# Patient Record
Sex: FEMALE | Race: BLACK OR AFRICAN AMERICAN
[De-identification: names, ages, dates, MRNs, and addresses within clinical notes are randomized per-mention and may not be internally consistent; named-entity substitution may affect disease eponyms.]

---

## 2019-08-07 ENCOUNTER — HOSPITAL ENCOUNTER (EMERGENCY)
Dept: HOSPITAL 61 - ER | Age: 84
Discharge: HOME | End: 2019-08-07
Payer: MEDICARE

## 2019-08-07 VITALS — DIASTOLIC BLOOD PRESSURE: 83 MMHG | SYSTOLIC BLOOD PRESSURE: 177 MMHG

## 2019-08-07 VITALS — BODY MASS INDEX: 20.09 KG/M2 | HEIGHT: 66 IN | WEIGHT: 125 LBS

## 2019-08-07 DIAGNOSIS — I10: ICD-10-CM

## 2019-08-07 DIAGNOSIS — F41.9: ICD-10-CM

## 2019-08-07 DIAGNOSIS — Z88.5: ICD-10-CM

## 2019-08-07 DIAGNOSIS — N39.0: Primary | ICD-10-CM

## 2019-08-07 DIAGNOSIS — K21.9: ICD-10-CM

## 2019-08-07 DIAGNOSIS — Z91.041: ICD-10-CM

## 2019-08-07 DIAGNOSIS — E11.9: ICD-10-CM

## 2019-08-07 DIAGNOSIS — R10.84: ICD-10-CM

## 2019-08-07 LAB
ALBUMIN SERPL-MCNC: 3.3 G/DL (ref 3.4–5)
ALBUMIN/GLOB SERPL: 0.9 {RATIO} (ref 1–1.7)
ALP SERPL-CCNC: 53 U/L (ref 46–116)
ALT SERPL-CCNC: 12 U/L (ref 14–59)
ANION GAP SERPL CALC-SCNC: 10 MMOL/L (ref 6–14)
APTT PPP: YELLOW S
AST SERPL-CCNC: 13 U/L (ref 15–37)
BACTERIA #/AREA URNS HPF: (no result) /HPF
BASOPHILS # BLD AUTO: 0 X10^3/UL (ref 0–0.2)
BASOPHILS NFR BLD: 1 % (ref 0–3)
BILIRUB SERPL-MCNC: 0.3 MG/DL (ref 0.2–1)
BILIRUB UR QL STRIP: NEGATIVE
BUN SERPL-MCNC: 9 MG/DL (ref 7–20)
BUN/CREAT SERPL: 13 (ref 6–20)
CALCIUM SERPL-MCNC: 8.9 MG/DL (ref 8.5–10.1)
CHLORIDE SERPL-SCNC: 107 MMOL/L (ref 98–107)
CO2 SERPL-SCNC: 28 MMOL/L (ref 21–32)
CREAT SERPL-MCNC: 0.7 MG/DL (ref 0.6–1)
EOSINOPHIL NFR BLD: 0.3 X10^3/UL (ref 0–0.7)
EOSINOPHIL NFR BLD: 6 % (ref 0–3)
ERYTHROCYTE [DISTWIDTH] IN BLOOD BY AUTOMATED COUNT: 12.8 % (ref 11.5–14.5)
FIBRINOGEN PPP-MCNC: CLEAR MG/DL
GFR SERPLBLD BASED ON 1.73 SQ M-ARVRAT: 96.7 ML/MIN
GLOBULIN SER-MCNC: 3.5 G/DL (ref 2.2–3.8)
GLUCOSE SERPL-MCNC: 166 MG/DL (ref 70–99)
HCT VFR BLD CALC: 43.2 % (ref 36–47)
HGB BLD-MCNC: 14.5 G/DL (ref 12–15.5)
LIPASE: 234 U/L (ref 73–393)
LYMPHOCYTES # BLD: 2.2 X10^3/UL (ref 1–4.8)
LYMPHOCYTES NFR BLD AUTO: 40 % (ref 24–48)
MCH RBC QN AUTO: 30 PG (ref 25–35)
MCHC RBC AUTO-ENTMCNC: 34 G/DL (ref 31–37)
MCV RBC AUTO: 91 FL (ref 79–100)
MONO #: 0.6 X10^3/UL (ref 0–1.1)
MONOCYTES NFR BLD: 11 % (ref 0–9)
NEUT #: 2.4 X10^3/UL (ref 1.8–7.7)
NEUTROPHILS NFR BLD AUTO: 43 % (ref 31–73)
NITRITE UR QL STRIP: NEGATIVE
PH UR STRIP: 6 [PH]
PLATELET # BLD AUTO: 238 X10^3/UL (ref 140–400)
POTASSIUM SERPL-SCNC: 4.2 MMOL/L (ref 3.5–5.1)
PROT SERPL-MCNC: 6.8 G/DL (ref 6.4–8.2)
PROT UR STRIP-MCNC: NEGATIVE MG/DL
RBC # BLD AUTO: 4.77 X10^6/UL (ref 3.5–5.4)
RBC #/AREA URNS HPF: (no result) /HPF (ref 0–2)
SODIUM SERPL-SCNC: 145 MMOL/L (ref 136–145)
SQUAMOUS #/AREA URNS LPF: (no result) /LPF
UROBILINOGEN UR-MCNC: 0.2 MG/DL
WBC # BLD AUTO: 5.5 X10^3/UL (ref 4–11)
WBC #/AREA URNS HPF: (no result) /HPF (ref 0–4)

## 2019-08-07 PROCEDURE — 99285 EMERGENCY DEPT VISIT HI MDM: CPT

## 2019-08-07 PROCEDURE — 85025 COMPLETE CBC W/AUTO DIFF WBC: CPT

## 2019-08-07 PROCEDURE — 36415 COLL VENOUS BLD VENIPUNCTURE: CPT

## 2019-08-07 PROCEDURE — 83690 ASSAY OF LIPASE: CPT

## 2019-08-07 PROCEDURE — 80053 COMPREHEN METABOLIC PANEL: CPT

## 2019-08-07 PROCEDURE — 84484 ASSAY OF TROPONIN QUANT: CPT

## 2019-08-07 PROCEDURE — 81001 URINALYSIS AUTO W/SCOPE: CPT

## 2019-08-07 PROCEDURE — 93005 ELECTROCARDIOGRAM TRACING: CPT

## 2019-08-07 NOTE — PHYS DOC
Past Medical History


Past Medical History:  Anxiety, Dementia, Diabetes-Type II, GERD, Hypertension, 

Seizure


Past Surgical History:  No Surgical History


Alcohol Use:  None


Drug Use:  None





Adult General


Chief Complaint


Chief Complaint:  ABDOMINAL PAIN





HPI


HPI


84-year-old female presenting to the emergency department today with a 

generalized abdominal pain that occurred a couple hours prior to arrival. Her 

son is here with her. Now her pain is resolved and she is feeling much better 

and would like to be discharged home. The pain was a throbbing nonradiating 

moderate pain that did not have any associated factors. Currently the patient is

asymptomatic.





Review of systems is negative for fevers chills vomiting headache chest pain or 

shortness of breath. All other review of systems is negative.





ED course: 84-year-old female presenting to the emergency department today with 

initially generalized abdominal pain that has completely resolved. Blood 

pressure is mildly elevated, otherwise her vital signs are unremarkable. Her 

abdomen is soft and nontender. I asked that we do some blood work and a urine 

testing before she go home which they were willing to. Blood work is 

unremarkable. Urine testing shows a probable urinary tract infection. We will 

give her Macrobid for treatment. She'll follow-up with her doctor in 2 days.





Review of Systems


Review of Systems





Constitutional: Denies fever or chills []


Eyes: Denies change in visual acuity, redness, or eye pain []


HENT: Denies nasal congestion or sore throat []


Respiratory: Denies cough or shortness of breath []


Cardiovascular: No additional information not addressed in HPI []


GI: Denies abdominal pain, nausea, vomiting, bloody stools or diarrhea []


: Denies dysuria or hematuria []


Musculoskeletal: Denies back pain or joint pain []


Integument: Denies rash or skin lesions []


Neurologic: Denies headache, focal weakness or sensory changes []


Endocrine: Denies polyuria or polydipsia []





All other systems were reviewed and found to be within normal limits, except as 

documented in this note.





Allergies


Allergies





Allergies








Coded Allergies Type Severity Reaction Last Updated Verified


 


  codeine Allergy Intermediate  12/3/18 Yes


 


  iodine Allergy Intermediate  12/3/18 Yes











Physical Exam


Physical Exam





Constitutional: Well developed, well nourished, no acute distress, non-toxic 

appearance. []


HENT: Normocephalic, atraumatic, bilateral external ears normal, oropharynx 

moist, no oral exudates, nose normal. []


Eyes: PERRLA, EOMI, conjunctiva normal, no discharge. [] 


Neck: Normal range of motion, no tenderness, supple, no stridor. [] 


Cardiovascular:Heart rate regular rhythm, no murmur []


Lungs & Thorax:  Bilateral breath sounds clear to auscultation []


Abdomen: Bowel sounds normal, soft, no tenderness, no masses, no pulsatile 

masses. [] 


Skin: Warm, dry, no erythema, no rash. [] 


Back: No tenderness, no CVA tenderness. [] 


Extremities: No tenderness, no cyanosis, no clubbing, ROM intact, no edema. [] 


Neurologic: Alert and oriented X 3, normal motor function, normal sensory 

function, no focal deficits noted. []


Psychologic: Affect normal, judgement normal, mood normal. []





Current Patient Data


Vital Signs





                                   Vital Signs








  Date Time  Temp Pulse Resp B/P (MAP) Pulse Ox O2 Delivery O2 Flow Rate FiO2


 


8/7/19 09:36 98.1 70 16 189/92 (124) 96 Room Air  





 98.1       








Lab Values





                                Laboratory Tests








Test


 8/7/19


09:45 8/7/19


10:53


 


Urine Collection Type Unknown   


 


Urine Color Yellow   


 


Urine Clarity Clear   


 


Urine pH 6.0   


 


Urine Specific Gravity 1.010   


 


Urine Protein


 Negative mg/dL


(NEG-TRACE) 





 


Urine Glucose (UA)


 Negative mg/dL


(NEG) 





 


Urine Ketones (Stick)


 Negative mg/dL


(NEG) 





 


Urine Blood


 Negative (NEG)


 





 


Urine Nitrite


 Negative (NEG)


 





 


Urine Bilirubin


 Negative (NEG)


 





 


Urine Urobilinogen Dipstick


 0.2 mg/dL (0.2


mg/dL) 





 


Urine Leukocyte Esterase


 Moderate (NEG)


 





 


Urine RBC


 3-5 /HPF (0-2)


 





 


Urine WBC


 5-10 /HPF


(0-4) 





 


Urine Squamous Epithelial


Cells Few /LPF  


 





 


Urine Bacteria


 Mod /HPF


(0-FEW) 





 


White Blood Count


 


 5.5 x10^3/uL


(4.0-11.0)


 


Red Blood Count


 


 4.77 x10^6/uL


(3.50-5.40)


 


Hemoglobin


 


 14.5 g/dL


(12.0-15.5)


 


Hematocrit


 


 43.2 %


(36.0-47.0)


 


Mean Corpuscular Volume


 


 91 fL ()





 


Mean Corpuscular Hemoglobin  30 pg (25-35)  


 


Mean Corpuscular Hemoglobin


Concent 


 34 g/dL


(31-37)


 


Red Cell Distribution Width


 


 12.8 %


(11.5-14.5)


 


Platelet Count


 


 238 x10^3/uL


(140-400)


 


Neutrophils (%) (Auto)  43 % (31-73)  


 


Lymphocytes (%) (Auto)  40 % (24-48)  


 


Monocytes (%) (Auto)  11 % (0-9)  H


 


Eosinophils (%) (Auto)  6 % (0-3)  H


 


Basophils (%) (Auto)  1 % (0-3)  


 


Neutrophils # (Auto)


 


 2.4 x10^3/uL


(1.8-7.7)


 


Lymphocytes # (Auto)


 


 2.2 x10^3/uL


(1.0-4.8)


 


Monocytes # (Auto)


 


 0.6 x10^3/uL


(0.0-1.1)


 


Eosinophils # (Auto)


 


 0.3 x10^3/uL


(0.0-0.7)


 


Basophils # (Auto)


 


 0.0 x10^3/uL


(0.0-0.2)


 


Sodium Level


 


 145 mmol/L


(136-145)


 


Potassium Level


 


 4.2 mmol/L


(3.5-5.1)


 


Chloride Level


 


 107 mmol/L


()


 


Carbon Dioxide Level


 


 28 mmol/L


(21-32)


 


Anion Gap  10 (6-14)  


 


Blood Urea Nitrogen


 


 9 mg/dL (7-20)





 


Creatinine


 


 0.7 mg/dL


(0.6-1.0)


 


Estimated GFR


(Cockcroft-Gault) 


 96.7  





 


BUN/Creatinine Ratio  13 (6-20)  


 


Glucose Level


 


 166 mg/dL


(70-99)  H


 


Calcium Level


 


 8.9 mg/dL


(8.5-10.1)


 


Total Bilirubin


 


 0.3 mg/dL


(0.2-1.0)


 


Aspartate Amino Transferase


(AST) 


 13 U/L (15-37)


L


 


Alanine Aminotransferase (ALT)


 


 12 U/L (14-59)


L


 


Alkaline Phosphatase


 


 53 U/L


()


 


Troponin I Quantitative


 


 < 0.017 ng/mL


(0.000-0.055)


 


Total Protein


 


 6.8 g/dL


(6.4-8.2)


 


Albumin


 


 3.3 g/dL


(3.4-5.0)  L


 


Albumin/Globulin Ratio


 


 0.9 (1.0-1.7)


L


 


Lipase


 


 234 U/L


()





                                Laboratory Tests


8/7/19 10:53








                                Laboratory Tests


8/7/19 10:53











EKG


EKG


[]





Radiology/Procedures


Radiology/Procedures


[]





Course & Med Decision Making


Course & Med Decision Making


Pertinent Labs and Imaging studies reviewed. (See chart for details)





[]





Dragon Disclaimer


Dragon Disclaimer


This electronic medical record was generated, in whole or in part, using a voice

 recognition dictation system.





Departure


Departure


Impression:  


   Primary Impression:  


   UTI (urinary tract infection)


Disposition:  01 HOME, SELF-CARE


Condition:  STABLE


Referrals:  


ALYX ADHIKARI (PCP)


Patient Instructions:  Abdominal Pain, Urinary Tract Infection





Additional Instructions:  


Thank you for allowing us to participate in your care today.





Return to the emergency department you have any new or worsening symptoms, or if

 you are concerned for any reason. Return to emergency department if you have 

any  new or concerning symptoms including but not limited to fever, chills, 

nausea, vomiting, intractable pain, any new rashes, chest pain, shortness of 

air, uncontrolled bleeding, difficulty breathing, and/or vision loss.





Follow up with your primary care physician within 1-2 days.  Call your Primary 

Doctor tomorrow and inform them of your visit today.  If you do not have a 

primary care provider we are happy to provide you with a list of our primary 

care providers contact information. 





This condition should be evaluated by your primary care physician and any 

recommended consulting services for continued management within 2 days after 

discharge. If at any time, you are having difficulty getting into your primary 

care doctor or a specialist, return to the emergency department.











BIRD CALI MD                Aug 7, 2019 10:15

## 2019-08-07 NOTE — EKG
[facility]

              [address] [place] [address]

Test Date:    2019               Test Time:    10:37:34

Pat Name:     ELISEO BOLTON             Department:   

Patient ID:   [id number]           Room:          

Gender:       F                        Technician:   

:          [id number]               Requested By: BIRD CALI

Order Number: [id number]           Reading MD:     

                                 Measurements

Intervals                              Axis          

Rate:         67                       P:            48

AR:           172                      QRS:          24

QRSD:         82                       T:            50

QT:           416                                    

QTc:          443                                    

                           Interpretive Statements

SINUS RHYTHM

QRS(T) CONTOUR ABNORMALITY

CONSIDER ANTEROSEPTAL MYOCARDIAL DAMAGE

POSSIBLY ABNORMAL ECG

RI6.01

No previous ECG available for comparison

## 2019-12-04 ENCOUNTER — HOSPITAL ENCOUNTER (EMERGENCY)
Dept: HOSPITAL 61 - ER | Age: 84
Discharge: HOME | End: 2019-12-04
Payer: MEDICARE

## 2019-12-04 VITALS — BODY MASS INDEX: 21.99 KG/M2 | WEIGHT: 132 LBS | HEIGHT: 65 IN

## 2019-12-04 VITALS — DIASTOLIC BLOOD PRESSURE: 77 MMHG | SYSTOLIC BLOOD PRESSURE: 133 MMHG
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
## 2019-12-04 DIAGNOSIS — I10: ICD-10-CM
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
## 2019-12-04 DIAGNOSIS — K21.9: ICD-10-CM

## 2019-12-04 DIAGNOSIS — S09.90XA: ICD-10-CM

## 2019-12-04 DIAGNOSIS — Z88.5: ICD-10-CM
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
## 2019-12-04 DIAGNOSIS — F41.9: ICD-10-CM

## 2019-12-04 DIAGNOSIS — Z88.8: ICD-10-CM

## 2019-12-04 DIAGNOSIS — E11.9: ICD-10-CM

## 2019-12-04 DIAGNOSIS — R51: ICD-10-CM

## 2019-12-04 DIAGNOSIS — F03.90: ICD-10-CM

## 2019-12-04 DIAGNOSIS — S00.11XA: Primary | ICD-10-CM

## 2019-12-04 PROCEDURE — 72125 CT NECK SPINE W/O DYE: CPT

## 2019-12-04 PROCEDURE — 70450 CT HEAD/BRAIN W/O DYE: CPT

## 2019-12-04 PROCEDURE — 70480 CT ORBIT/EAR/FOSSA W/O DYE: CPT

## 2019-12-04 PROCEDURE — 99284 EMERGENCY DEPT VISIT MOD MDM: CPT

## 2019-12-04 NOTE — PHYS DOC
Past Medical History


Past Medical History:  Anxiety, Dementia, Diabetes-Type II, GERD, Hypertension, 

Seizure


Past Surgical History:  Other


Additional Past Surgical Histo:  CATARACT, LEFT TOE


Alcohol Use:  None


Drug Use:  None





Adult General


Chief Complaint


Chief Complaint:  MECHANICAL FALL





HPI


HPI





Patient is a 84  year old AA female, accompanied by her family, who presents to 

the emergency department with complaints of bruising to her right eye. Family 

states the patient was sitting on the edge of her bed last night and eating ice 

cream when she slipped off the bed. Patient denies hitting her head however has 

bruising around her right eye. Patient denies any nausea, vomiting. Family 

states that the fall was witnessed, they deny any loss of consciousness or 

bleeding from the nose or ears. She has a history of dementia and is alert and 

oriented per normal according to family. Patient currently denies any pain. She 

denies any vision changes, neck pain, headache, or extremity pain/swelling as 

well. All other ROS is neg unless otherwise noted in HPI.





Review of Systems


Review of Systems


See Above





Allergies


Allergies





Allergies








Coded Allergies Type Severity Reaction Last Updated Verified


 


  codeine Allergy Intermediate  12/3/18 Yes


 


  iodine Allergy Intermediate  12/3/18 Yes











Physical Exam


Physical Exam


See Above


Constitutional: Well developed, well nourished, no acute distress, non-toxic 

appearance. []


HENT: Normocephalic, atraumatic, bilateral external ears normal, bilateral TMs 

normal, oropharynx moist, no oral exudates, nose normal. []


Eyes: PERRLA, EOMI, conjunctiva normal, no discharge; bruising and tenderness to

 lateral right orbit, no crepitus or palpable deformity. [] 


Neck: Normal range of motion, no tenderness, supple, no stridor. [] 


Cardiovascular:Heart rate regular rhythm, no murmur []


Lungs & Thorax:  Bilateral breath sounds clear to auscultation []


Skin: Warm, dry, no erythema, no rash. [] 


Back: No tenderness


Extremities: No cyanosis, no obvious deformity, ROM intact, no edema. [] 


Neurologic: Alert and oriented X 2, no focal deficits noted. []


Psychologic: Affect normal, judgement normal, mood normal. []





Current Patient Data


Vital Signs





                                   Vital Signs








  Date Time  Temp Pulse Resp B/P (MAP) Pulse Ox O2 Delivery O2 Flow Rate FiO2


 


12/4/19 09:20 98.6 77 16 133/77 (95) 96 Room Air  





 98.6       











EKG


EKG


[]





Radiology/Procedures


Radiology/Procedures


PROCEDURE: CT HEAD AND CERVICAL SPINE WO





CT head and cervical spine without contrast 12/4/2019 9:48 AM


 


Indication:   Trauma


 


Comparison: CT head cervical spine December 3, 2018


 


Procedure: Multidetector CT imaging of the head and cervical spine was 


performed without the administration of contrast.


 


Findings: There is no evidence of acute intracranial hemorrhage. There is 


no evidence of acute territorial infarction. Please note that CT is 


limited for evaluation of acute ischemia. No mass effect or midline shift 


is identified . The ventricles and basilar cisterns have an appropriate 


appearance. No abnormal extra-axial fluid collections are seen. No acute 


osseous changes are identified. 


 


Findings: There is no evidence of acute fracture or alignment abnormality 


of the cervical spine. Vertebral body heights and disc spaces are similar 


to comparison study. Degenerative changes are similar. The atlantoaxial 


articulation remains intact. There is no prevertebral soft tissue 


swelling. Soft tissues are otherwise unremarkable. No bony compromise of 


the spinal canal is seen.


 


Impression:


1. No evidence of acute intracranial abnormality 


2. Stable degenerative changes of the cervical spine without evidence of 


acute fracture or alignment abnormality  





PROCEDURE: CT ORBITS WO CONTRAST





CT of the bony orbits without contrast 12/4/2019


 


INDICATION: Fall, right-sided trauma to the superior orbital region.


 


COMPARISON STUDY: None


 


FINDINGS: Multidetector CT imaging of the orbits was obtained without 


contrast for evaluation of bony structures.


 


FINDINGS: Sonographic arches are intact. Paranasal sinuses demonstrate no 


acute hemorrhage. Mild areas of mucosal thickening noted. Note that the 


maxillary sinuses are partially visualized. The maxilla is partially 


visualized. The pterygoid plates appear to be intact. Nasal bones are 


intact


 


 


The bony orbits are grossly intact. Prior cataract surgery noted 


bilaterally. Bilateral globes are otherwise intact. No intraconal or 


extraconal orbital abnormalities are identified. Probable mild 


ecchymosis/subcutaneous hemorrhage is seen overlying the right lateral 


supraorbital region.


 


IMPRESSION: No evidence of acute osseous and normalities involving bony 


orbits.


 


 []





Course & Med Decision Making


Course & Med Decision Making


Pertinent Labs and Imaging studies reviewed. (See chart for details)





[]





Dragon Disclaimer


Dragon Disclaimer


This electronic medical record was generated, in whole or in part, using a voice

 recognition dictation system.





Departure


Departure


Impression:  


   Primary Impression:  


   Fall


   Additional Impression:  


   Closed head injury without loss of consciousness


Disposition:  01 HOME, SELF-CARE


Condition:  STABLE


Referrals:  


YAW WALLS MD (PCP)


Patient Instructions:  Head Injury, Adult, Easy-to-Read





Additional Instructions:  


Tylenol or ibuprofen as needed for pain. Follow the head injury precautions 

provided. Follow up with your primary care doctor in 1-2 days. Return to the ER 

if symptoms worsen.





Problem Qualifiers








   Primary Impression:  


   Fall


   Encounter type:  initial encounter  Qualified Codes:  W19.XXXA - Unspecified 

   fall, initial encounter


   Additional Impression:  


   Closed head injury without loss of consciousness


   Encounter type:  initial encounter  Qualified Codes:  S09.90XA - Unspecified 

   injury of head, initial encounter








DONOVAN LUNA APRN        Dec 4, 2019 10:49

## 2019-12-04 NOTE — RAD
CT of the bony orbits without contrast 12/4/2019

 

INDICATION: Fall, right-sided trauma to the superior orbital region.

 

COMPARISON STUDY: None

 

FINDINGS: Multidetector CT imaging of the orbits was obtained without 

contrast for evaluation of bony structures.

 

FINDINGS: Sonographic arches are intact. Paranasal sinuses demonstrate no 

acute hemorrhage. Mild areas of mucosal thickening noted. Note that the 

maxillary sinuses are partially visualized. The maxilla is partially 

visualized. The pterygoid plates appear to be intact. Nasal bones are 

intact

 

 

The bony orbits are grossly intact. Prior cataract surgery noted 

bilaterally. Bilateral globes are otherwise intact. No intraconal or 

extraconal orbital abnormalities are identified. Probable mild 

ecchymosis/subcutaneous hemorrhage is seen overlying the right lateral 

supraorbital region.

 

IMPRESSION: No evidence of acute osseous and normalities involving bony 

orbits.

 

CT DOSING PQRS STATEMENT: 

One or more of the following individualized dose reduction techniques were

utilized for this examination: 

 1. Automated exposure control 

 2. Adjustment of the mA and/or kV according to patient size  

3. Use of iterative reconstruction technique

 

Electronically signed by: Jakub Lee MD (12/4/2019 10:56 AM) [id number]

## 2019-12-04 NOTE — RAD
CT head and cervical spine without contrast 12/4/2019 9:48 AM

 

Indication:   Trauma

 

Comparison: CT head cervical spine December 3, 2018

 

Procedure: Multidetector CT imaging of the head and cervical spine was 

performed without the administration of contrast.

 

Findings: There is no evidence of acute intracranial hemorrhage. There is 

no evidence of acute territorial infarction. Please note that CT is 

limited for evaluation of acute ischemia. No mass effect or midline shift 

is identified . The ventricles and basilar cisterns have an appropriate 

appearance. No abnormal extra-axial fluid collections are seen. No acute 

osseous changes are identified. 

 

Findings: There is no evidence of acute fracture or alignment abnormality 

of the cervical spine. Vertebral body heights and disc spaces are similar 

to comparison study. Degenerative changes are similar. The atlantoaxial 

articulation remains intact. There is no prevertebral soft tissue 

swelling. Soft tissues are otherwise unremarkable. No bony compromise of 

the spinal canal is seen.

 

Impression:

1. No evidence of acute intracranial abnormality 

2. Stable degenerative changes of the cervical spine without evidence of 

acute fracture or alignment abnormality  

 

CT DOSING PQRS STATEMENT: 

One or more of the following individualized dose reduction techniques were

utilized for this examination: 

 1. Automated exposure control 

 2. Adjustment of the mA and/or kV according to patient size  

3. Use of iterative reconstruction technique

 

Electronically signed by: Jakub Lee MD (12/4/2019 10:42 AM) [id number]

## 2019-12-26 ENCOUNTER — HOSPITAL ENCOUNTER (INPATIENT)
Dept: HOSPITAL 61 - ER | Age: 84
LOS: 1 days | Discharge: HOME | DRG: 101 | End: 2019-12-27
Attending: INTERNAL MEDICINE | Admitting: INTERNAL MEDICINE
Payer: MEDICARE

## 2019-12-26 VITALS — WEIGHT: 124.06 LBS | HEIGHT: 65 IN | BODY MASS INDEX: 20.67 KG/M2

## 2019-12-26 VITALS — DIASTOLIC BLOOD PRESSURE: 72 MMHG | SYSTOLIC BLOOD PRESSURE: 141 MMHG

## 2019-12-26 DIAGNOSIS — I10: ICD-10-CM

## 2019-12-26 DIAGNOSIS — G30.9: ICD-10-CM

## 2019-12-26 DIAGNOSIS — F41.9: ICD-10-CM

## 2019-12-26 DIAGNOSIS — E87.2: ICD-10-CM

## 2019-12-26 DIAGNOSIS — G40.201: Primary | ICD-10-CM

## 2019-12-26 DIAGNOSIS — E78.5: ICD-10-CM

## 2019-12-26 DIAGNOSIS — E11.9: ICD-10-CM

## 2019-12-26 DIAGNOSIS — F02.80: ICD-10-CM

## 2019-12-26 DIAGNOSIS — Z72.820: ICD-10-CM

## 2019-12-26 DIAGNOSIS — G25.0: ICD-10-CM

## 2019-12-26 DIAGNOSIS — Z91.041: ICD-10-CM

## 2019-12-26 DIAGNOSIS — K21.9: ICD-10-CM

## 2019-12-26 DIAGNOSIS — K57.90: ICD-10-CM

## 2019-12-26 DIAGNOSIS — Z82.0: ICD-10-CM

## 2019-12-26 DIAGNOSIS — F32.9: ICD-10-CM

## 2019-12-26 DIAGNOSIS — Z79.899: ICD-10-CM

## 2019-12-26 DIAGNOSIS — Z88.5: ICD-10-CM

## 2019-12-26 DIAGNOSIS — Z79.84: ICD-10-CM

## 2019-12-26 LAB
ALBUMIN SERPL-MCNC: 3.4 G/DL (ref 3.4–5)
ALBUMIN/GLOB SERPL: 1 {RATIO} (ref 1–1.7)
ALP SERPL-CCNC: 63 U/L (ref 46–116)
ALT SERPL-CCNC: 12 U/L (ref 14–59)
ANION GAP SERPL CALC-SCNC: 12 MMOL/L (ref 6–14)
APTT PPP: YELLOW S
AST SERPL-CCNC: 13 U/L (ref 15–37)
BACTERIA #/AREA URNS HPF: 0 /HPF
BASOPHILS # BLD AUTO: 0 X10^3/UL (ref 0–0.2)
BASOPHILS NFR BLD: 1 % (ref 0–3)
BILIRUB SERPL-MCNC: 0.2 MG/DL (ref 0.2–1)
BILIRUB UR QL STRIP: (no result)
BUN SERPL-MCNC: 11 MG/DL (ref 7–20)
BUN/CREAT SERPL: 14 (ref 6–20)
CALCIUM SERPL-MCNC: 8.7 MG/DL (ref 8.5–10.1)
CHLORIDE SERPL-SCNC: 105 MMOL/L (ref 98–107)
CK SERPL-CCNC: 39 U/L (ref 26–192)
CO2 SERPL-SCNC: 28 MMOL/L (ref 21–32)
CREAT SERPL-MCNC: 0.8 MG/DL (ref 0.6–1)
EOSINOPHIL NFR BLD: 0.1 X10^3/UL (ref 0–0.7)
EOSINOPHIL NFR BLD: 2 % (ref 0–3)
ERYTHROCYTE [DISTWIDTH] IN BLOOD BY AUTOMATED COUNT: 13.9 % (ref 11.5–14.5)
FIBRINOGEN PPP-MCNC: CLEAR MG/DL
GFR SERPLBLD BASED ON 1.73 SQ M-ARVRAT: 82.9 ML/MIN
GLOBULIN SER-MCNC: 3.3 G/DL (ref 2.2–3.8)
GLUCOSE SERPL-MCNC: 135 MG/DL (ref 70–99)
HCT VFR BLD CALC: 46.8 % (ref 36–47)
HGB BLD-MCNC: 15.7 G/DL (ref 12–15.5)
HYALINE CASTS #/AREA URNS LPF: (no result) /HPF
LYMPHOCYTES # BLD: 2.9 X10^3/UL (ref 1–4.8)
LYMPHOCYTES NFR BLD AUTO: 44 % (ref 24–48)
MAGNESIUM SERPL-MCNC: 1.7 MG/DL (ref 1.8–2.4)
MCH RBC QN AUTO: 31 PG (ref 25–35)
MCHC RBC AUTO-ENTMCNC: 34 G/DL (ref 31–37)
MCV RBC AUTO: 91 FL (ref 79–100)
MONO #: 0.6 X10^3/UL (ref 0–1.1)
MONOCYTES NFR BLD: 8 % (ref 0–9)
NEUT #: 2.9 X10^3/UL (ref 1.8–7.7)
NEUTROPHILS NFR BLD AUTO: 44 % (ref 31–73)
NITRITE UR QL STRIP: NEGATIVE
PH UR STRIP: 5 [PH]
PLATELET # BLD AUTO: 232 X10^3/UL (ref 140–400)
POTASSIUM SERPL-SCNC: 4.1 MMOL/L (ref 3.5–5.1)
PROT SERPL-MCNC: 6.7 G/DL (ref 6.4–8.2)
PROT UR STRIP-MCNC: 100 MG/DL
RBC # BLD AUTO: 5.14 X10^6/UL (ref 3.5–5.4)
RBC #/AREA URNS HPF: 0 /HPF (ref 0–2)
SODIUM SERPL-SCNC: 145 MMOL/L (ref 136–145)
SQUAMOUS #/AREA URNS LPF: (no result) /LPF
UROBILINOGEN UR-MCNC: 1 MG/DL
WBC # BLD AUTO: 6.6 X10^3/UL (ref 4–11)
WBC #/AREA URNS HPF: 0 /HPF (ref 0–4)

## 2019-12-26 PROCEDURE — 82550 ASSAY OF CK (CPK): CPT

## 2019-12-26 PROCEDURE — 36415 COLL VENOUS BLD VENIPUNCTURE: CPT

## 2019-12-26 PROCEDURE — 83036 HEMOGLOBIN GLYCOSYLATED A1C: CPT

## 2019-12-26 PROCEDURE — P9612 CATHETERIZE FOR URINE SPEC: HCPCS

## 2019-12-26 PROCEDURE — 82607 VITAMIN B-12: CPT

## 2019-12-26 PROCEDURE — 83735 ASSAY OF MAGNESIUM: CPT

## 2019-12-26 PROCEDURE — 83605 ASSAY OF LACTIC ACID: CPT

## 2019-12-26 PROCEDURE — 81001 URINALYSIS AUTO W/SCOPE: CPT

## 2019-12-26 PROCEDURE — 84443 ASSAY THYROID STIM HORMONE: CPT

## 2019-12-26 PROCEDURE — 83880 ASSAY OF NATRIURETIC PEPTIDE: CPT

## 2019-12-26 PROCEDURE — 84484 ASSAY OF TROPONIN QUANT: CPT

## 2019-12-26 PROCEDURE — 82962 GLUCOSE BLOOD TEST: CPT

## 2019-12-26 PROCEDURE — 80177 DRUG SCRN QUAN LEVETIRACETAM: CPT

## 2019-12-26 PROCEDURE — 93005 ELECTROCARDIOGRAM TRACING: CPT

## 2019-12-26 PROCEDURE — G0378 HOSPITAL OBSERVATION PER HR: HCPCS

## 2019-12-26 PROCEDURE — 85025 COMPLETE CBC W/AUTO DIFF WBC: CPT

## 2019-12-26 PROCEDURE — 80048 BASIC METABOLIC PNL TOTAL CA: CPT

## 2019-12-26 PROCEDURE — 71045 X-RAY EXAM CHEST 1 VIEW: CPT

## 2019-12-26 PROCEDURE — 80053 COMPREHEN METABOLIC PANEL: CPT

## 2019-12-26 PROCEDURE — 70450 CT HEAD/BRAIN W/O DYE: CPT

## 2019-12-26 RX ADMIN — HEPARIN SODIUM SCH UNIT: 5000 INJECTION, SOLUTION INTRAVENOUS; SUBCUTANEOUS at 21:30

## 2019-12-26 RX ADMIN — METOPROLOL TARTRATE SCH MG: 50 TABLET, FILM COATED ORAL at 21:18

## 2019-12-26 NOTE — RAD
Examination: CT HEAD WO CONTRAST

 

History: Seizure

 

Comparison/Correlation: 12/4/2019 CT head and cervical spine

 

Findings: Axial images of the head were obtained without contrast. Atrophy

is present. Chronic ischemic changes of the white matter noted. No 

intracranial hemorrhage, midline shift, or mass effect. Bony structures 

are unremarkable. Partial opacification of posterior left ethmoid air 

cells noted. Right uzma bullosa is present.

 

Impression:

No suspicious process.

 

 

PQRS Compliance Statement:

 

One or more of the following individualized dose reduction techniques were

utilized for this examination:  

1. Automated exposure control  

2. Adjustment of the mA and/or kV according to patient size  

3. Use of iterative reconstruction technique

 

Electronically signed by: Richardson Burris MD (12/26/2019 4:39 PM) [facility]

## 2019-12-26 NOTE — PDOC1
History and Physical


Date of Admission


Date of Admission


DATE: 12/26/19 


TIME: 16:24





Identification/Chief Complaint


Chief Complaint


Seizures





Source


Source:  Patient





History of Present Illness


History of Present Illness


Ms Stewart is an 83yo F w/ PMHx Alzheimer's disease, epilepsy (partial complex 

seizures), HTN, HLD, anxiety, depression, DM2 brought to ED by her  due 

to 3 seizures during the day. He notes one of the seizures lasted almost 6 

minutes. She has been taking her keppra 2000mg PO BID, but he notes she ran out 

of lorazepam which he gives her prn TID.





She was apparently confused and agitated in transit, refusing hospital 

evaluation and denying seizure activity, but calmed upon evaluation by the ED 

physician, Dr. Taylor





EKG was NSR, rate of 74, indeterminate axis, incomplete right bundle-branch 

block, no acute distress and T-wave elevation.





CXR clear, CT head negative.





Patient is pleasant, but not oriented on my examination, and she is agreeable to

hospitalization given 3 breakthrough seizures with no obvious inciting event.





Past Medical History


Cardiovascular:  HTN, Hyperlipidemia


CENTRAL NERVOUS SYSTEM:  Dementia, Seizure, Other


GI:  Diverticulosis


Psych:  Anxiety, Depression


Endocrine:  Diabetes





Past Surgical History


Past Surgical History:  Other





Family History


Family History:  Family History Unknown





Social History


Smoke:  No


ALCOHOL:  none


Drugs:  None





Current Medications


Current Medications





Active Scripts


Active


Macrobid 100 Mg Capsule (Nitrofurantoin Monohyd/M-Cryst) 100 Mg Capsule 1 Cap PO

BID


Lorazepam 0.5 Mg Tablet 1 Mg PO PRN TID PRN 30 Days


Reported


Keppra (Levetiracetam) 100 Mg/1 Ml Solution 2,000 Mg PO BID


Ondansetron Odt (Ondansetron) 4 Mg Tab.rapdis 1 Tab PO PRN Q6-8HRS


Lisinopril 40 Mg Tablet 1 Tab PO DAILY


Metformin Hcl 1,000 Mg Tablet 1 Tab PO BID


Escitalopram Oxalate 10 Mg Tablet 10 Mg PO DAILY


Mirtazapine 15 Mg Tablet 1 Tab PO QHS


Donepezil Hcl 10 Mg Tab.rapdis 10 Mg PO HS


Olanzapine 5 Mg Tablet 5 Mg PO DAILY


Metoprolol Succinate ( Xl ) (Metoprolol Succinate) 25 Mg Tab.er.24h 50 Mg PO BID





Allergies


Allergies:  


Coded Allergies:  


     codeine (Verified  Allergy, Intermediate, 12/3/18)


     iodine (Verified  Allergy, Intermediate, 12/3/18)





ROS


General:  YES: Fatigue, Malaise; 


   No: Chills, Night Sweats, Appetite, Other


PSYCHOLOGICAL ROS:  YES: Disorientation, Memory difficulties; 


   No: Anxiety, Behavioral Disorder, Concentration difficultie, Decreased 

libido, Depression, Hallucinations, Hostility, Irritablity, Mood Swings, 

Obsessive thoughts, Physical abuse, Sexual abuse, Sleep disturbances, Suicidal 

ideation, Other


Eyes:  No Blurry vision, No Decreased vision, No Double vision, No Dry eyes, No 

Excessive tearing, No Eye Pain, No Itchy Eyes, No Loss of vision, No 

Photophobia, No Scotomata, No Uses contacts, No Uses glasses, No Other


HEENT:  No: Heacaches, Visual Changes, Hearing change, Nasal congestion, Nasal 

discharge, Oral lesions, Sinus pain, Sore Throat, Epistaxis, Sneezing, Snoring, 

Tinnitus, Vertigo, Vocal changes, Other


ALLERGY AND IMMUNOLOGY:  No: Hives, Insect Bite Sensitivity, Itchy/Watery Eyes, 

Nasal Congestion, Post Nasal Drip, Seasonal Allergies, Other


Hematological and Lymphatic:  No: Bleeding Problems, Blood Clots, Blood 

Transfusions, Brusing, Night Sweats, Pallor, Swollen Lymph Nodes, Other


ENDOCRINE:  No: Breast Changes, Galactorrhea, Hair Pattern Changes, Hot Flashes,

Malaise/lethargy, Mood Swings, Palpitations, Polydipsia/polyuria, Skin Changes, 

Temperature Intolerance, Unexpected Weight Changes, Other


Breast:  No New/Changing Breast Lumps, No Nipple changes, No Nipple discharge, 

No Other


Respiratory:  No: Cough, Hemoptysis, Orthopnea, Pleuritic Pain, Shortness of 

breath, SOB with excertion, Sputum Changes, Stridor, Tachypnea, Wheezing, Other


Cardiovascular:  No Chest Pain, No Palpitations, No Orthopnea, No Paroxysmal 

Noc. Dyspnea, No Edema, No Lt Headedness, No Other


Gastrointestinal:  No Nausea, No Vomiting, No Abdominal Pain, No Diarrhea, No 

Constipation, No Melena, No Hematochezia, No Other


Genitourinary:  No Dysuria, No Frequency, No Incontinence, No Hematuria, No 

Retention, No Discharge, No Urgency, No Pain, No Flank Pain, No Other, No , No ,

No , No , No , No , No 


Musculoskeletal:  No Gait Disturbance, No Joint Pain, No Joint Stiffness, No 

Joint Swelling, No Muscle Pain, No Muscular Weakness, No Pain In:, No Swelling 

In:, No Other


Neurological:  Yes Behavorial Changes, Yes Confusion, Yes Memory Loss, Yes 

Seizures; 


   No Bowel/Bladder ControlChng, No Dizziness, No Gait Disturbance, No 

Headaches, No Impaired Coord/balance, No Numbness/Tingling, No Speech Problems, 

No Tremors, No Visual Changes, No Weakness, No Other


Skin:  No Dry Skin, No Eczema, No Hair Changes, No Lumps, No Mole Changes, No Mo

ttling, No Nail Changes, No Pruritus, No Rash, No Skin Lesion Changes, No Other,

No Acne





Physical Exam


General:  Alert, Cooperative, No acute distress


HEENT:  Atraumatic, PERRLA, EOMI, Mucous membr. moist/pink


Lungs:  Clear to auscultation, Normal air movement


Heart:  S1S2, RRR, no thrills, no rubs, no gallops, no murmurs


Abdomen:  Normal bowel sounds, Soft, No tenderness, No hepatosplenomegaly, No 

masses


Rectal Exam:  not examined


Extremities:  No clubbing, No cyanosis, No edema, Normal pulses, No 

tenderness/swelling


Skin:  No rashes, No breakdown, No significant lesion


Neuro:  Normal gait, Normal speech, Strength at 5/5 X4 ext, Normal tone, 

Sensation intact, Cranial nerves 3-12 NL, Reflexes 2+


Psych/Mental Status:  Other (Confused)





Labs


Labs





Laboratory Tests








Test


 12/26/19


16:10


 


White Blood Count


 6.6 x10^3/uL


(4.0-11.0)


 


Red Blood Count


 5.14 x10^6/uL


(3.50-5.40)


 


Hemoglobin


 15.7 g/dL


(12.0-15.5)


 


Hematocrit


 46.8 %


(36.0-47.0)


 


Mean Corpuscular Volume 91 fL () 


 


Mean Corpuscular Hemoglobin 31 pg (25-35) 


 


Mean Corpuscular Hemoglobin


Concent 34 g/dL


(31-37)


 


Red Cell Distribution Width


 13.9 %


(11.5-14.5)


 


Platelet Count


 232 x10^3/uL


(140-400)


 


Neutrophils (%) (Auto) 44 % (31-73) 


 


Lymphocytes (%) (Auto) 44 % (24-48) 


 


Monocytes (%) (Auto) 8 % (0-9) 


 


Eosinophils (%) (Auto) 2 % (0-3) 


 


Basophils (%) (Auto) 1 % (0-3) 


 


Neutrophils # (Auto)


 2.9 x10^3/uL


(1.8-7.7)


 


Lymphocytes # (Auto)


 2.9 x10^3/uL


(1.0-4.8)


 


Monocytes # (Auto)


 0.6 x10^3/uL


(0.0-1.1)


 


Eosinophils # (Auto)


 0.1 x10^3/uL


(0.0-0.7)


 


Basophils # (Auto)


 0.0 x10^3/uL


(0.0-0.2)








Laboratory Tests








Test


 12/26/19


16:10


 


White Blood Count


 6.6 x10^3/uL


(4.0-11.0)


 


Red Blood Count


 5.14 x10^6/uL


(3.50-5.40)


 


Hemoglobin


 15.7 g/dL


(12.0-15.5)


 


Hematocrit


 46.8 %


(36.0-47.0)


 


Mean Corpuscular Volume 91 fL () 


 


Mean Corpuscular Hemoglobin 31 pg (25-35) 


 


Mean Corpuscular Hemoglobin


Concent 34 g/dL


(31-37)


 


Red Cell Distribution Width


 13.9 %


(11.5-14.5)


 


Platelet Count


 232 x10^3/uL


(140-400)


 


Neutrophils (%) (Auto) 44 % (31-73) 


 


Lymphocytes (%) (Auto) 44 % (24-48) 


 


Monocytes (%) (Auto) 8 % (0-9) 


 


Eosinophils (%) (Auto) 2 % (0-3) 


 


Basophils (%) (Auto) 1 % (0-3) 


 


Neutrophils # (Auto)


 2.9 x10^3/uL


(1.8-7.7)


 


Lymphocytes # (Auto)


 2.9 x10^3/uL


(1.0-4.8)


 


Monocytes # (Auto)


 0.6 x10^3/uL


(0.0-1.1)


 


Eosinophils # (Auto)


 0.1 x10^3/uL


(0.0-0.7)


 


Basophils # (Auto)


 0.0 x10^3/uL


(0.0-0.2)











Images


Images


CXR - The cardiomediastinal silhouette is similar in appearance. Right hilar 

prominence, stable dating back to 2012 and presumed benign. Lungs are otherwise 

clear.


There are no significant pleural effusions. There is no pulmonary vascular 

congestion. No pneumothorax.


IMPRESSION:


There is no acute cardiopulmonary process.





CT HEAD WO CONTRAST - Atrophy is present. Chronic ischemic changes of the white 

matter noted. No intracranial hemorrhage, midline shift, or mass effect. Bony 

structures are unremarkable. Partial opacification of posterior left ethmoid air

cells noted. Right uzma bullosa is present.


Impression:


No suspicious process.





VTE Prophylaxis Ordered


VTE Prophylaxis Devices:  Yes


VTE Pharmacological Prophylaxi:  Yes





Assessment/Plan


Assessment/Plan


A/P:


Seizures - inciting event is unclear, no sign of infectious etiology. Given her 

dementia, sleep deprivation could play a role. Lorazepam could be the etiology 

if she is withdrawing. I will defer to neurology expertise, but given her age a 

plan for lorazepam usage is likely necessary, whether it is a scheduled taper or

strict very low dose prn or discontinued use. Will cont keppra 2000mg BID.


Alzheimer's disease - under treatment, on medications. Will give sunlight during

the day, frequent redirection. Try to reduce or eliminate lorazepam if this is 

possible.


Epilepsy - partial complex seizures per chart history. Given the above 

breakthrough seizures, will consult neurology for further recommendations


HTN - cont meds


HLD - cont meds


Anxiety, depression - on remeron, zyprexa, celexa, will continue if ok with 

neurology


DM2 - basal bolus plus regimen for insulin in house.





FEN - ADA diet


PPX - heparin


FULL CODE


Dispo - Inpatient for breakthrough seizures











CORTNEY GOODE MD        Dec 26, 2019 16:27

## 2019-12-26 NOTE — RAD
PORTABLE CHEST 1V 12/26/2019 4:05 PM

 

INDICATION: Seizure

 

COMPARISON: 7/9/2012

 

TECHNIQUE: Portable frontal view of the chest is provided.

 

FINDINGS:

 

The cardiomediastinal silhouette is similar in appearance. Right hilar 

prominence, stable dating back to 2012 and presumed benign. Lungs are 

otherwise clear.

 

There are no significant pleural effusions. There is no pulmonary vascular

congestion. No pneumothorax.

 

IMPRESSION:

 

There is no acute cardiopulmonary process.

 

Electronically signed by: Malgorzata Moses MD (12/26/2019 4:31 PM) 

[facility]

## 2019-12-26 NOTE — NUR
ADMIT NOTE

The patient, ELISEO BOLTON, 85 y/o, F admitted by CORTNEY GOODE MD, was given 
written information regarding hospital policies, unit procedures and contact persons. 
Patient A&O to self, afebrile, VSS, with no complaints of pain upon admission. Spouse 
present on admission, assisting with patient history and other admission questions. 

Plan of care discussed with patient and family, home medications and allergies verified, and 
admit packet reviewed. 

Patient in bed, bed in lowest/locked position, call light within reach, seizure precautions 
in place, and family at bedside, will continue to monitor.

## 2019-12-26 NOTE — PHYS DOC
Past Medical History


Past Medical History:  Anxiety, Dementia, Diabetes-Type II, GERD, Hypertension, 

Seizure


Past Surgical History:  Other


Additional Past Surgical Histo:  CATARACT, LEFT TOE


Alcohol Use:  None


Drug Use:  None





Adult General


Chief Complaint


Chief Complaint:  ALTERED MENTAL STATUS





HPI


HPI





Patient is a 84  year old female with history of hypertension, anxiety, diabetes

mellitus, dementia, seizure who presents via EMS with complaint of seizure. 

Patient is alert and oriented 1 and unable to give history. Patient  

called EMS and stated the patient had 3 episodes of seizure. Patient was 

screaming and didn't want to come to the hospital but after was put in the 

ambulance was calm and cooperative.





Review of Systems


Review of Systems





Unable to obtain





Allergies


Allergies





Allergies








Coded Allergies Type Severity Reaction Last Updated Verified


 


  codeine Allergy Intermediate  12/3/18 Yes


 


  iodine Allergy Intermediate  12/3/18 Yes











Physical Exam


Physical Exam





Constitutional: Well nourished, no acute distress, non-toxic appearance. []


HENT: Normocephalic, atraumatic.


Eyes: PERRLA, EOMI, conjunctiva normal, no discharge. [] 


Neck: Normal range of motion, no tenderness, supple, no stridor. [] 


Cardiovascular:Heart rate regular rhythm, no murmur []


Lungs & Thorax:  Bilateral breath sounds clear to auscultation []


Abdomen: Bowel sounds normal, soft, no tenderness, no masses, no pulsatile 

masses. [] 


Skin: Warm, dry, no erythema, no rash. [] 


Back: No tenderness, no CVA tenderness. [] 


Extremities: No tenderness, no cyanosis, no clubbing, ROM intact, no edema. [] 


Neurologic: Alert and oriented X 1, normal motor function, normal sensory 

function, no focal deficits noted. []


Psychologic: Affect normal, judgement normal, mood normal. []





Current Patient Data


Vital Signs





                                   Vital Signs








  Date Time  Temp Pulse Resp B/P (MAP) Pulse Ox O2 Delivery O2 Flow Rate FiO2


 


19 16:00  69   100   


 


19 15:50 98.6  18 124/80 (95)  Room Air  





 98.6       








Lab Values





                                Laboratory Tests








Test


 19


16:00 19


16:10


 


Urine Collection Type U cath   


 


Urine Color Yellow   


 


Urine Clarity Clear   


 


Urine pH 5.0   


 


Urine Specific Gravity 1.025   


 


Urine Protein


 100 mg/dL


(NEG-TRACE) 





 


Urine Glucose (UA)


 Negative mg/dL


(NEG) 





 


Urine Ketones (Stick)


 Negative mg/dL


(NEG) 





 


Urine Blood


 Negative (NEG)


 





 


Urine Nitrite


 Negative (NEG)


 





 


Urine Bilirubin Small (NEG)   


 


Urine Urobilinogen Dipstick


 1.0 mg/dL (0.2


mg/dL) 





 


Urine Leukocyte Esterase


 Negative (NEG)


 





 


Urine RBC 0 /HPF (0-2)   


 


Urine WBC 0 /HPF (0-4)   


 


Urine Squamous Epithelial


Cells None /LPF  


 





 


Urine Bacteria


 0 /HPF (0-FEW)


 





 


Urine Cellular Casts Occ /HPF   


 


Urine Hyaline Casts Many /HPF   


 


Urine Mucus Marked /LPF   


 


White Blood Count


 


 6.6 x10^3/uL


(4.0-11.0)


 


Red Blood Count


 


 5.14 x10^6/uL


(3.50-5.40)


 


Hemoglobin


 


 15.7 g/dL


(12.0-15.5)  H


 


Hematocrit


 


 46.8 %


(36.0-47.0)


 


Mean Corpuscular Volume


 


 91 fL ()





 


Mean Corpuscular Hemoglobin  31 pg (25-35)  


 


Mean Corpuscular Hemoglobin


Concent 


 34 g/dL


(31-37)


 


Red Cell Distribution Width


 


 13.9 %


(11.5-14.5)


 


Platelet Count


 


 232 x10^3/uL


(140-400)


 


Neutrophils (%) (Auto)  44 % (31-73)  


 


Lymphocytes (%) (Auto)  44 % (24-48)  


 


Monocytes (%) (Auto)  8 % (0-9)  


 


Eosinophils (%) (Auto)  2 % (0-3)  


 


Basophils (%) (Auto)  1 % (0-3)  


 


Neutrophils # (Auto)


 


 2.9 x10^3/uL


(1.8-7.7)


 


Lymphocytes # (Auto)


 


 2.9 x10^3/uL


(1.0-4.8)


 


Monocytes # (Auto)


 


 0.6 x10^3/uL


(0.0-1.1)


 


Eosinophils # (Auto)


 


 0.1 x10^3/uL


(0.0-0.7)


 


Basophils # (Auto)


 


 0.0 x10^3/uL


(0.0-0.2)





                                Laboratory Tests


19 16:10














EKG


EKG


EKG interpreted by me. EKG at 1557 showed normal sinus rhythm at rate of 74, 

indeterminate axis, incomplete right bundle-branch block, no acute distress and 

T-wave elevation.





Radiology/Procedures


Radiology/Procedures


[][id number] [place] [address]


                                 [phone]


                                        


                                 IMAGING REPORT





                                     Signed





PATIENT: ELISEO BOLTON     ACCOUNT: [id number]     MRN#: [id number]


: 1935           LOCATION: ER              AGE: 84


SEX: F                    EXAM DT: 19         ACCESSION#: [id number]


STATUS: REG ER            ORD. PHYSICIAN: MANOLO CROOKS MD


REASON: seizure


PROCEDURE: PORTABLE CHEST 1V





PORTABLE CHEST 1V 2019 4:05 PM


 


INDICATION: Seizure


 


COMPARISON: 2012


 


TECHNIQUE: Portable frontal view of the chest is provided.


 


FINDINGS:


 


The cardiomediastinal silhouette is similar in appearance. Right hilar 


prominence, stable dating back to  and presumed benign. Lungs are 


otherwise clear.


 


There are no significant pleural effusions. There is no pulmonary vascular


congestion. No pneumothorax.


 


IMPRESSION:


 


There is no acute cardiopulmonary process.


 


Electronically signed by: Summer Andino MD (2019 4:31 PM) 


[facility]














DICTATED and SIGNED BY:     SUMMER ANDINO MD


DATE:     19 1631


                            [address] [id number] [phone]


                                        


                                 IMAGING REPORT





                                     Signed





PATIENT: ELISEO BOLTON     ACCOUNT: [id number]     MRN#: [id number]


: 1935           LOCATION: ER              AGE: 84


SEX: F                    EXAM DT: 19         ACCESSION#: [id number]


STATUS: REG ER            ORD. PHYSICIAN: MANOLO CROOKS MD


REASON: seizure


PROCEDURE: CT HEAD WO CONTRAST





Examination: CT HEAD WO CONTRAST


 


History: Seizure


 


Comparison/Correlation: 2019 CT head and cervical spine


 


Findings: Axial images of the head were obtained without contrast. Atrophy


is present. Chronic ischemic changes of the white matter noted. No 


intracranial hemorrhage, midline shift, or mass effect. Bony structures 


are unremarkable. Partial opacification of posterior left ethmoid air 


cells noted. Right uzma bullosa is present.


 


Impression:


No suspicious process.


 


 


PQRS Compliance Statement:


 


One or more of the following individualized dose reduction techniques were


utilized for this examination:  


1. Automated exposure control  


2. Adjustment of the mA and/or kV according to patient size  


3. Use of iterative reconstruction technique


 


Electronically signed by: Richardson Jarvis MD (2019 4:39 PM) [facility]














DICTATED and SIGNED BY:     RICHARDSON JARVIS MD


DATE:     19 9373





Course & Med Decision Making


Course & Med Decision Making


Pertinent Labs and Imaging studies reviewed. (See chart for details)





Evaluation of patient in ER showed 83-year-old female patient with history of 3 

episodes of seizure per her   and agitation and altered mental status. 

Plan to admit patient. Patient requiring admission for further evaluation and 

treatment. Discussed with Dr. Austin who is in agreement with admission. 

Discussed findings and plan with patient and family, who acknowledge 

understanding and agreement. CMP is pending.





Dragon Disclaimer


Dragon Disclaimer


This electronic medical record was generated, in whole or in part, using a voice

 recognition dictation system.





Departure


Departure


Impression:  


   Primary Impression:  


   Status epilepticus


   Additional Impression:  


   Agitation


Disposition:  09 ADMITTED AS INPATIENT


Admitting Physician:  HIMS


Condition:  STABLE


Referrals:  


YAW WALLS MD (PCP)





Problem Qualifiers











MANOLO CROOKS MD             Dec 26, 2019 17:06

## 2019-12-27 VITALS — DIASTOLIC BLOOD PRESSURE: 56 MMHG | SYSTOLIC BLOOD PRESSURE: 123 MMHG

## 2019-12-27 VITALS — SYSTOLIC BLOOD PRESSURE: 153 MMHG | DIASTOLIC BLOOD PRESSURE: 65 MMHG

## 2019-12-27 VITALS — DIASTOLIC BLOOD PRESSURE: 61 MMHG | SYSTOLIC BLOOD PRESSURE: 105 MMHG

## 2019-12-27 LAB
ANION GAP SERPL CALC-SCNC: 12 MMOL/L (ref 6–14)
BUN SERPL-MCNC: 13 MG/DL (ref 7–20)
CALCIUM SERPL-MCNC: 8.5 MG/DL (ref 8.5–10.1)
CHLORIDE SERPL-SCNC: 107 MMOL/L (ref 98–107)
CO2 SERPL-SCNC: 25 MMOL/L (ref 21–32)
CREAT SERPL-MCNC: 0.7 MG/DL (ref 0.6–1)
GFR SERPLBLD BASED ON 1.73 SQ M-ARVRAT: 96.7 ML/MIN
GLUCOSE SERPL-MCNC: 157 MG/DL (ref 70–99)
MAGNESIUM SERPL-MCNC: 2.2 MG/DL (ref 1.8–2.4)
POTASSIUM SERPL-SCNC: 3.9 MMOL/L (ref 3.5–5.1)
SODIUM SERPL-SCNC: 144 MMOL/L (ref 136–145)

## 2019-12-27 RX ADMIN — HEPARIN SODIUM SCH UNIT: 5000 INJECTION, SOLUTION INTRAVENOUS; SUBCUTANEOUS at 14:00

## 2019-12-27 RX ADMIN — METOPROLOL TARTRATE SCH MG: 50 TABLET, FILM COATED ORAL at 10:32

## 2019-12-27 RX ADMIN — HEPARIN SODIUM SCH UNIT: 5000 INJECTION, SOLUTION INTRAVENOUS; SUBCUTANEOUS at 06:09

## 2019-12-27 NOTE — EKG
[facility]

              [address] [place] [address]

Test Date:    2019               Test Time:    15:57:15

Pat Name:     ELISEO BOLTON             Department:   

Patient ID:   [id number]           Room:          

Gender:       F                        Technician:   

:          1935               Requested By: MANOLO CROOKS

Order Number: [id number]           Reading MD:     

                                 Measurements

Intervals                              Axis          

Rate:         74                       P:            69

NE:           168                      QRS:          44

QRSD:         122                      T:            60

QT:           408                                    

QTc:          458                                    

                           Interpretive Statements

SINUS RHYTHM

INDETERMINATE AXIS

INCOMPLETE RIGHT BUNDLE BRANCH BLOCK

BORDERLINE ECG

No previous ECG available for comparison

## 2019-12-27 NOTE — PDOC3
Discharge Summary


Visit Information


Date of Admission:  Dec 26, 2019


Date of Discharge:  Dec 27, 2019


Admitting Diagnosis Comment:


Breakthrough partial-complex seizures, no obvious metabolic disturbance, doing 

fine now


Alzheimer's disease


Previous concern about diversion of her lorazepam, Urine drug screen okay in 

November 2018


Low magnesium level, corrected now


Lactic acidosis related to seizure


Final Diagnosis


Problems


Medical Problems:


(1) Agitation


Status: Acute  





(2) Status epilepticus


Status: Acute  











Brief Hospital Course


Allergies





                                    Allergies








Coded Allergies Type Severity Reaction Last Updated Verified


 


  codeine Allergy Intermediate  12/3/18 Yes


 


  iodine Allergy Intermediate  12/3/18 Yes








Vital Signs





Vital Signs








  Date Time  Temp Pulse Resp B/P (MAP) Pulse Ox O2 Delivery O2 Flow Rate FiO2


 


12/27/19 10:34  65  105/61    


 


12/27/19 07:00 98.1  18  91 Room Air  





 98.1       








Lab Results





Laboratory Tests








Test


 12/26/19


16:00 12/26/19


16:10 12/26/19


19:15 12/26/19


20:08


 


Urine Collection Type U cath    


 


Urine Color Yellow    


 


Urine Clarity Clear    


 


Urine pH 5.0    


 


Urine Specific Gravity 1.025    


 


Urine Protein


 100 mg/dL


(NEG-TRACE) 


 


 





 


Urine Glucose (UA)


 Negative mg/dL


(NEG) 


 


 





 


Urine Ketones (Stick)


 Negative mg/dL


(NEG) 


 


 





 


Urine Blood Negative (NEG)    


 


Urine Nitrite Negative (NEG)    


 


Urine Bilirubin Small (NEG)    


 


Urine Urobilinogen Dipstick


 1.0 mg/dL (0.2


mg/dL) 


 


 





 


Urine Leukocyte Esterase Negative (NEG)    


 


Urine RBC 0 /HPF (0-2)    


 


Urine WBC 0 /HPF (0-4)    


 


Urine Squamous Epithelial


Cells None /LPF 


 


 


 





 


Urine Bacteria 0 /HPF (0-FEW)    


 


Urine Cellular Casts Occ /HPF    


 


Urine Hyaline Casts Many /HPF    


 


Urine Mucus Marked /LPF    


 


White Blood Count


 


 6.6 x10^3/uL


(4.0-11.0) 


 





 


Red Blood Count


 


 5.14 x10^6/uL


(3.50-5.40) 


 





 


Hemoglobin


 


 15.7 g/dL


(12.0-15.5) 


 





 


Hematocrit


 


 46.8 %


(36.0-47.0) 


 





 


Mean Corpuscular Volume  91 fL ()   


 


Mean Corpuscular Hemoglobin  31 pg (25-35)   


 


Mean Corpuscular Hemoglobin


Concent 


 34 g/dL


(31-37) 


 





 


Red Cell Distribution Width


 


 13.9 %


(11.5-14.5) 


 





 


Platelet Count


 


 232 x10^3/uL


(140-400) 


 





 


Neutrophils (%) (Auto)  44 % (31-73)   


 


Lymphocytes (%) (Auto)  44 % (24-48)   


 


Monocytes (%) (Auto)  8 % (0-9)   


 


Eosinophils (%) (Auto)  2 % (0-3)   


 


Basophils (%) (Auto)  1 % (0-3)   


 


Neutrophils # (Auto)


 


 2.9 x10^3/uL


(1.8-7.7) 


 





 


Lymphocytes # (Auto)


 


 2.9 x10^3/uL


(1.0-4.8) 


 





 


Monocytes # (Auto)


 


 0.6 x10^3/uL


(0.0-1.1) 


 





 


Eosinophils # (Auto)


 


 0.1 x10^3/uL


(0.0-0.7) 


 





 


Basophils # (Auto)


 


 0.0 x10^3/uL


(0.0-0.2) 


 





 


Sodium Level


 


 


 145 mmol/L


(136-145) 





 


Potassium Level


 


 


 4.1 mmol/L


(3.5-5.1) 





 


Chloride Level


 


 


 105 mmol/L


() 





 


Carbon Dioxide Level


 


 


 28 mmol/L


(21-32) 





 


Anion Gap   12 (6-14)  


 


Blood Urea Nitrogen


 


 


 11 mg/dL


(7-20) 





 


Creatinine


 


 


 0.8 mg/dL


(0.6-1.0) 





 


Estimated GFR


(Cockcroft-Gault) 


 


 82.9 


 





 


BUN/Creatinine Ratio   14 (6-20)  


 


Glucose Level


 


 


 135 mg/dL


(70-99) 





 


Lactic Acid Level


 


 


 4.0 mmol/L


(0.4-2.0) 





 


Calcium Level


 


 


 8.7 mg/dL


(8.5-10.1) 





 


Magnesium Level


 


 


 1.7 mg/dL


(1.8-2.4) 





 


Total Bilirubin


 


 


 0.2 mg/dL


(0.2-1.0) 





 


Aspartate Amino Transf


(AST/SGOT) 


 


 13 U/L (15-37) 


 





 


Alanine Aminotransferase


(ALT/SGPT) 


 


 12 U/L (14-59) 


 





 


Alkaline Phosphatase


 


 


 63 U/L


() 





 


Creatine Kinase


 


 


 39 U/L


() 





 


Troponin I Quantitative


 


 


 < 0.017 ng/mL


(0.000-0.055) 





 


NT-Pro-B-Type Natriuretic


Peptide 


 


 161 pg/mL


(0-449) 





 


Total Protein


 


 


 6.7 g/dL


(6.4-8.2) 





 


Albumin


 


 


 3.4 g/dL


(3.4-5.0) 





 


Albumin/Globulin Ratio   1.0 (1.0-1.7)  


 


Vitamin B12 Level


 


 


 314 pg/mL


(247-911) 





 


Thyroid Stimulating Hormone


(TSH) 


 


 1.786 uIU/mL


(0.358-3.74) 





 


Glucose (Fingerstick)


 


 


 


 121 mg/dL


(70-99)


 


Test


 12/26/19


21:30 12/27/19


07:54 12/27/19


09:00 





 


Lactic Acid Level


 2.5 mmol/L


(0.4-2.0) 


 


 





 


Glucose (Fingerstick)


 


 117 mg/dL


(70-99) 


 





 


Sodium Level


 


 


 144 mmol/L


(136-145) 





 


Potassium Level


 


 


 3.9 mmol/L


(3.5-5.1) 





 


Chloride Level


 


 


 107 mmol/L


() 





 


Carbon Dioxide Level


 


 


 25 mmol/L


(21-32) 





 


Anion Gap   12 (6-14)  


 


Blood Urea Nitrogen


 


 


 13 mg/dL


(7-20) 





 


Creatinine


 


 


 0.7 mg/dL


(0.6-1.0) 





 


Estimated GFR


(Cockcroft-Gault) 


 


 96.7 


 





 


Glucose Level


 


 


 157 mg/dL


(70-99) 





 


Calcium Level


 


 


 8.5 mg/dL


(8.5-10.1) 





 


Magnesium Level


 


 


 2.2 mg/dL


(1.8-2.4) 











Laboratory Tests








Test


 12/26/19


16:00 12/26/19


16:10 12/26/19


19:15 12/26/19


20:08


 


Urine Collection Type U cath    


 


Urine Color Yellow    


 


Urine Clarity Clear    


 


Urine pH 5.0    


 


Urine Specific Gravity 1.025    


 


Urine Protein


 100 mg/dL


(NEG-TRACE) 


 


 





 


Urine Glucose (UA)


 Negative mg/dL


(NEG) 


 


 





 


Urine Ketones (Stick)


 Negative mg/dL


(NEG) 


 


 





 


Urine Blood Negative (NEG)    


 


Urine Nitrite Negative (NEG)    


 


Urine Bilirubin Small (NEG)    


 


Urine Urobilinogen Dipstick


 1.0 mg/dL (0.2


mg/dL) 


 


 





 


Urine Leukocyte Esterase Negative (NEG)    


 


Urine RBC 0 /HPF (0-2)    


 


Urine WBC 0 /HPF (0-4)    


 


Urine Squamous Epithelial


Cells None /LPF 


 


 


 





 


Urine Bacteria 0 /HPF (0-FEW)    


 


Urine Cellular Casts Occ /HPF    


 


Urine Hyaline Casts Many /HPF    


 


Urine Mucus Marked /LPF    


 


White Blood Count


 


 6.6 x10^3/uL


(4.0-11.0) 


 





 


Red Blood Count


 


 5.14 x10^6/uL


(3.50-5.40) 


 





 


Hemoglobin


 


 15.7 g/dL


(12.0-15.5) 


 





 


Hematocrit


 


 46.8 %


(36.0-47.0) 


 





 


Mean Corpuscular Volume  91 fL ()   


 


Mean Corpuscular Hemoglobin  31 pg (25-35)   


 


Mean Corpuscular Hemoglobin


Concent 


 34 g/dL


(31-37) 


 





 


Red Cell Distribution Width


 


 13.9 %


(11.5-14.5) 


 





 


Platelet Count


 


 232 x10^3/uL


(140-400) 


 





 


Neutrophils (%) (Auto)  44 % (31-73)   


 


Lymphocytes (%) (Auto)  44 % (24-48)   


 


Monocytes (%) (Auto)  8 % (0-9)   


 


Eosinophils (%) (Auto)  2 % (0-3)   


 


Basophils (%) (Auto)  1 % (0-3)   


 


Neutrophils # (Auto)


 


 2.9 x10^3/uL


(1.8-7.7) 


 





 


Lymphocytes # (Auto)


 


 2.9 x10^3/uL


(1.0-4.8) 


 





 


Monocytes # (Auto)


 


 0.6 x10^3/uL


(0.0-1.1) 


 





 


Eosinophils # (Auto)


 


 0.1 x10^3/uL


(0.0-0.7) 


 





 


Basophils # (Auto)


 


 0.0 x10^3/uL


(0.0-0.2) 


 





 


Sodium Level


 


 


 145 mmol/L


(136-145) 





 


Potassium Level


 


 


 4.1 mmol/L


(3.5-5.1) 





 


Chloride Level


 


 


 105 mmol/L


() 





 


Carbon Dioxide Level


 


 


 28 mmol/L


(21-32) 





 


Anion Gap   12 (6-14)  


 


Blood Urea Nitrogen


 


 


 11 mg/dL


(7-20) 





 


Creatinine


 


 


 0.8 mg/dL


(0.6-1.0) 





 


Estimated GFR


(Cockcroft-Gault) 


 


 82.9 


 





 


BUN/Creatinine Ratio   14 (6-20)  


 


Glucose Level


 


 


 135 mg/dL


(70-99) 





 


Lactic Acid Level


 


 


 4.0 mmol/L


(0.4-2.0) 





 


Calcium Level


 


 


 8.7 mg/dL


(8.5-10.1) 





 


Magnesium Level


 


 


 1.7 mg/dL


(1.8-2.4) 





 


Total Bilirubin


 


 


 0.2 mg/dL


(0.2-1.0) 





 


Aspartate Amino Transf


(AST/SGOT) 


 


 13 U/L (15-37) 


 





 


Alanine Aminotransferase


(ALT/SGPT) 


 


 12 U/L (14-59) 


 





 


Alkaline Phosphatase


 


 


 63 U/L


() 





 


Creatine Kinase


 


 


 39 U/L


() 





 


Troponin I Quantitative


 


 


 < 0.017 ng/mL


(0.000-0.055) 





 


NT-Pro-B-Type Natriuretic


Peptide 


 


 161 pg/mL


(0-449) 





 


Total Protein


 


 


 6.7 g/dL


(6.4-8.2) 





 


Albumin


 


 


 3.4 g/dL


(3.4-5.0) 





 


Albumin/Globulin Ratio   1.0 (1.0-1.7)  


 


Vitamin B12 Level


 


 


 314 pg/mL


(247-911) 





 


Thyroid Stimulating Hormone


(TSH) 


 


 1.786 uIU/mL


(0.358-3.74) 





 


Glucose (Fingerstick)


 


 


 


 121 mg/dL


(70-99)


 


Test


 12/26/19


21:30 12/27/19


07:54 12/27/19


09:00 





 


Lactic Acid Level


 2.5 mmol/L


(0.4-2.0) 


 


 





 


Glucose (Fingerstick)


 


 117 mg/dL


(70-99) 


 





 


Sodium Level


 


 


 144 mmol/L


(136-145) 





 


Potassium Level


 


 


 3.9 mmol/L


(3.5-5.1) 





 


Chloride Level


 


 


 107 mmol/L


() 





 


Carbon Dioxide Level


 


 


 25 mmol/L


(21-32) 





 


Anion Gap   12 (6-14)  


 


Blood Urea Nitrogen


 


 


 13 mg/dL


(7-20) 





 


Creatinine


 


 


 0.7 mg/dL


(0.6-1.0) 





 


Estimated GFR


(Cockcroft-Gault) 


 


 96.7 


 





 


Glucose Level


 


 


 157 mg/dL


(70-99) 





 


Calcium Level


 


 


 8.5 mg/dL


(8.5-10.1) 





 


Magnesium Level


 


 


 2.2 mg/dL


(1.8-2.4) 











Brief Hospital Course


Ms. 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

by  at home, THEy are known to Dr Sousa, SHe also has dementia, SHE 

has not missed any of her keppra doses, but maybe some ativan? She seems to have

no PT needs, no more SZ here, CLeared by neuro,  requested ativan RX and 

I gave some





Ff up neuro 2 mos





OBS STAY





CT head neg, CXR neg


PT seen and examined, dw  too





Discharge Information


Condition at Discharge:  Improved, Stable


Disposition/Orders:  D/C to Home


Scheduled


Amlodipine Besylate (Amlodipine Besylate) 5 Mg Tablet, 5 MG PO DAILY for 

Hypertension, (Reported)


   Entered as Reported by: LETI LONG on 12/26/19 1931


   Last Action: Continued on 12/26/19 1941 by CORTNEY GOODE MD


Donepezil Hcl (Donepezil Hcl) 10 Mg Tablet, 10 MG PO DAILY for Alzh

eimer/Dementia, (Reported)


   Entered as Reported by: LETI LONG on 12/26/19 1931


   Last Action: Continued on 12/26/19 1941 by CORTNEY GOODE MD


Escitalopram Oxalate (Escitalopram Oxalate) 10 Mg Tablet, 10 MG PO DAILY for 

ANTI-DEPRESSANT, #30 Ref 0 (Reported)


   Entered as Reported by: SHAZIA DIAZ on 3/10/17 0627


   Last Action: Converted on 12/26/19 1941 by CORTNEY GOODE MD


Famotidine (Famotidine) 20 Mg Tablet, 20 MG PO BID for Heartburn, (Reported)


   Entered as Reported by: LETI LONG on 12/26/19 1931


   Last Action: Continued on 12/26/19 1941 by CORTNEY GOODE MD


Levetiracetam (Levetiracetam) 500 Mg Tablet, 500 MG PO BID for Seizures, 

(Reported)


   Entered as Reported by: LETI LONG on 12/26/19 1931


   Last Action: Reviewed on 12/27/19 0901 by CHEVY AGEE


Lisinopril (Lisinopril) 40 Mg Tablet, 1 TAB PO DAILY for Hypertension, #30 Ref 5

(Reported)


   Entered as Reported by: LETI LONG on 12/26/19 1931


   Last Action: Continued on 12/26/19 1941 by CORTNEY GOODE MD


Lorazepam (Ativan) 1 Mg Tablet, 1 MG PO TID for Anxiety/Agitation, #60


   Prescribed by: CHEVY AGEE on 12/27/19 1038


Metformin Hcl (Metformin Hcl) 1,000 Mg Tablet, 1 TAB PO BID, #180 Ref 3 

(Reported)


   Entered as Reported by: SHAZIA DIAZ on 3/10/17 1557


   Last Action: Converted on 12/27/19 0901 by CHEVY AGEE


Metoprolol Succinate (Metoprolol Succinate ( Xl )) 25 Mg Tab.er.24h, 50 MG PO 

BID for FOR HYPERTENSION, #30 Ref 0 (Reported)


   Entered as Reported by: SHAZIA DIAZ on 3/10/17 1557


   Last Action: HELD on 12/27/19 0901 by CHEVY AGEE


Mirtazapine (Mirtazapine) 15 Mg Tablet, 1 TAB PO QHS, #30 (Reported)


   Entered as Reported by: SHAZIA DIAZ on 3/10/17 1557


   Last Action: Continued on 12/26/19 1941 by CORTNEY GOODE MD


Olanzapine (Olanzapine) 5 Mg Tablet, 5 MG PO DAILY, (Reported)


   Entered as Reported by: SHAZIA DIAZ on 3/10/17 1557


   Last Action: Continued on 12/26/19 1941 by CORTNEY GOODE MD


Ondansetron (Ondansetron Odt) 4 Mg Tab.rapdis, 1 TAB PO PRN Q6-8HRS, (Reported)


   Entered as Reported by: BENSON PEREZ on 3/10/17 1921


   Last Action: Continued on 12/26/19 1941 by CORTNEY GOODE MD


Ondansetron Hcl (Ondansetron Hcl) 4 Mg Tablet, 4 MG PO Q8HRS for Nausea, 

(Reported)


   Entered as Reported by: LETI LONG on 12/26/19 1931


   Last Action: Reviewed on 12/27/19 0901 by CHEVY AGEE





Discontinued Medications


Donepezil Hcl (Donepezil Hcl) 10 Mg Tab.rapdis, 10 MG PO HS, (Reported)


   Entered as Reported by: SHAZIA DIAZ on 3/10/17 1557


Escitalopram Oxalate (Escitalopram Oxalate) 10 Mg Tablet, 10 MG PO DAILY for 

Depression, (Reported)


   Entered as Reported by: LETI LONG on 12/26/19 1931


   Last Action: Reviewed on 12/27/19 0901 by CHEVY AGEE


Levetiracetam (Keppra) 100 Mg/1 Ml Solution, 2,000 MG PO BID, (Reported)


   Entered as Reported by: TABATHA MANUEL on 3/11/17 1418


   Last Action: Continued on 12/26/19 1941 by CORTNEY GOODE MD


Lisinopril (Lisinopril) 40 Mg Tablet, 1 TAB PO DAILY, (Reported)


   Entered as Reported by: BENSON PEREZ on 3/10/17 1921


   Last Action: Reviewed on 12/27/19 0901 by CHEVY AGEE


Lorazepam (Lorazepam) 0.5 Mg Tablet, 1 MG PO PRN TID PRN for ANXIETY / AGITATION

for 30 Days, #90 Ref 0


   Prescribed by: YANG GASTON on 9/29/18 1345


   Last Action: Continued on 12/26/19 1941 by CORTNEY GOODE MD


Metformin Hcl (Metformin Hcl) 1,000 Mg Tablet, 1,000 MG PO DAILYWBKFT for ANTI-

DIABETIC, Ref 0 (Reported)


   Entered as Reported by: LETI LONG on 12/26/19 1931


   Last Action: New Order on 12/26/19 1931 by LETI LONG


Metoprolol Tartrate (Metoprolol Tartrate) 50 Mg Tablet, 5 MG PO BID for 

Hypertension, (Reported)


   Entered as Reported by: LETI LONG on 12/26/19 1931


   Last Action: Continued on 12/26/19 1941 by CORTNEY GOODE MD


Mirtazapine (Mirtazapine) 15 Mg Tablet, 1 TAB PO QHS for Depression, #30 Ref 3 

(Reported)


   Entered as Reported by: LETI LONG on 12/26/19 1931


   Last Action: HELD on 12/27/19 0901 by CHEVY AGEE


Nitrofurantoin Monohyd/M-Cryst (Macrobid 100 Mg Capsule) 100 Mg Capsule, 1 CAP 

PO BID, #10


   Prescribed by: BIRD CALI on 8/7/19 1145


   Last Action: HELD on 12/27/19 0901 by CHEVY ELAM MD           Dec 27, 2019 10:41

## 2019-12-27 NOTE — PDOC2
NEUROLOGY CONSULT


Date of Admission


Date of Admission


DATE: 12/27/19 


TIME: 09:26





Reason for Consult


Reason for Consult:


Seizures





Referring Physician


Referring Physician:


Dr. Austin


PCP:  Dr. Carter





Source


Source:  Caregiver (), Chart review, Patient





History of Present Illness


History of Present Illness


The patient is an 83-year-old right-handed female whom I follow in the office 

regarding Alzheimer's disease and epilepsy. I last saw her 5 months ago. She 

does get flurry of seizures and indeed had 3 minor seizures yesterday with 

staring and mild tremulous movements. The last one lasted about 6 minutes. She 

has not missed any doses of her levetiracetam. Note she is on Ativan PRN but has

not missed any doses of this. There has been no recent change in condition or 

medications. The patient was an emergency department for a fall recently (12/4).

In the past, urine drug screens have been negative for the benzodiazepines, so 

there is a question of whether the Ativan is being diverted. I see from the 

clinic chart that the patient was positive for lorazepam on an 11/13/18 specific

urine drug screen.





Past Medical History


Cardiovascular:  HTN, Hyperlipidemia


CENTRAL NERVOUS SYSTEM:  Dementia, Seizure, Other (Essential tremor, gait 

disorder)


GI:  Diverticulosis


Psych:  Anxiety, Depression


Endocrine:  Diabetes





Past Surgical History


Past Surgical History:  Other (Toe)





Family History


Family History:  Other ( Alzheimer's)





Social History


Social History


, no alcohol or tobacco





Current Medications


Current Medications





Current Medications


Amlodipine Besylate (Norvasc) 5 mg DAILY PO ;  Start 12/27/19 at 09:00


Donepezil HCl (Aricept) 10 mg DAILY PO ;  Start 12/27/19 at 09:00


Famotidine (Pepcid) 20 mg QHS PO  Last administered on 12/26/19at 21:18;  Start 

12/26/19 at 21:00


Levetiracetam (Keppra) 2,000 mg BID PO  Last administered on 12/26/19at 21:17;  

Start 12/26/19 at 21:00


Lisinopril (Prinivil) 40 mg DAILY PO ;  Start 12/27/19 at 09:00


Lorazepam (Ativan) 1 mg PRN TID  PRN PO ANXIETY / AGITATION Last administered on

12/26/19at 21:18;  Start 12/26/19 at 19:45


Metoprolol Tartrate (Lopressor) 50 mg BID PO  Last administered on 12/26/19at 

21:18;  Start 12/26/19 at 21:00


Mirtazapine (Remeron) 15 mg QHS PO  Last administered on 12/26/19at 21:18;  

Start 12/26/19 at 21:00


Olanzapine (ZyPREXA) 5 mg DAILY PO ;  Start 12/27/19 at 09:00


Ondansetron HCl (Zofran Odt) 4 mg PRN Q6HRS  PRN PO nausea;  Start 12/26/19 at 

19:45


Citalopram Hydrobromide (CeleXA) 20 mg DAILY PO ;  Start 12/27/19 at 09:00


Ondansetron HCl (Zofran) 4 mg PRN Q4HRS  PRN IV NAUSEA/VOMITING;  Start 12/26/19

at 19:45


Acetaminophen (Tylenol) 650 mg PRN Q4HRS  PRN PO TEMP OVER 100.4F OR MILD PAIN; 

Start 12/26/19 at 19:45


Docusate Sodium (Colace) 100 mg PRN BID  PRN PO CONSTIPATION;  Start 12/26/19 at

19:45


Heparin Sodium (Porcine) (Heparin Sodium) 5,000 unit Q8HRS SQ  Last administered

on 12/27/19at 06:09;  Start 12/26/19 at 22:00


Latanoprost (Xalatan) 1 drop QHS OU  Last administered on 12/26/19at 21:19;  

Start 12/26/19 at 21:00


Magnesium Sulfate 50 ml @ 25 mls/hr 1X  ONCE IV  Last administered on 12/26/19at

22:41;  Start 12/26/19 at 22:00;  Stop 12/26/19 at 23:59;  Status DC


Lorazepam (Ativan Inj) 2 mg PRN Q4HRS  PRN IVP ANXIETY / AGITATION;  Start 

12/27/19 at 09:00


Lorazepam (Ativan) 1 mg TID PO ;  Start 12/27/19 at 09:00


Metformin HCl (Glucophage) 1,000 mg BIDWMEALS PO ;  Start 12/27/19 at 12:00


Insulin Human Lispro (HumaLOG) 0-9 UNITS TIDWMEALS SQ ;  Start 12/27/19 at 12:00


Dextrose (Dextrose 50%-Water Syringe) 12.5 gm PRN Q15MIN  PRN IV SEE COMMENTS;  

Start 12/27/19 at 09:00


Dextrose (Iv Dextrose 5%) 250 ml PRN Q15MIN  PRN IV SEE COMMENTS;  Start 

12/27/19 at 09:00





Active Scripts


Active


Macrobid 100 Mg Capsule (Nitrofurantoin Monohyd/M-Cryst) 100 Mg Capsule 1 Cap PO

BID


Lorazepam 0.5 Mg Tablet 1 Mg PO PRN TID PRN 30 Days


Reported


Mirtazapine 15 Mg Tablet 1 Tab PO QHS


Lisinopril 40 Mg Tablet 1 Tab PO DAILY


Metformin Hcl 1,000 Mg Tablet 1,000 Mg PO DAILYWBKFT


Famotidine 20 Mg Tablet 20 Mg PO BID


Escitalopram Oxalate 10 Mg Tablet 10 Mg PO DAILY


Metoprolol Tartrate 50 Mg Tablet 5 Mg PO BID


Ativan (Lorazepam) 1 Mg Tablet 1 Mg PO TID


Amlodipine Besylate 5 Mg Tablet 5 Mg PO DAILY


Levetiracetam 500 Mg Tablet 500 Mg PO BID


Donepezil Hcl 10 Mg Tablet 10 Mg PO DAILY


Ondansetron Hcl 4 Mg Tablet 4 Mg PO Q8HRS


Keppra (Levetiracetam) 100 Mg/1 Ml Solution 2,000 Mg PO BID


Ondansetron Odt (Ondansetron) 4 Mg Tab.rapdis 1 Tab PO PRN Q6-8HRS


Lisinopril 40 Mg Tablet 1 Tab PO DAILY


Metformin Hcl 1,000 Mg Tablet 1 Tab PO BID


Escitalopram Oxalate 10 Mg Tablet 10 Mg PO DAILY


Mirtazapine 15 Mg Tablet 1 Tab PO QHS


Donepezil Hcl 10 Mg Tab.rapdis 10 Mg PO HS


Olanzapine 5 Mg Tablet 5 Mg PO DAILY


Metoprolol Succinate ( Xl ) (Metoprolol Succinate) 25 Mg Tab.er.24h 50 Mg PO BID





Allergies


Allergies:  


Coded Allergies:  


     codeine (Verified  Allergy, Intermediate, 12/3/18)


     iodine (Verified  Allergy, Intermediate, 12/3/18)





ROS


Review of System


Negative for fever, chills, weight loss, shortness of breath, chest pain, 

indigestion, hematochezia, melena, and dysuria.  Full 14-point review of systems

is negative.





Physical Exam


Physical Examination


General:


Well-developed, well-nourished, black female, in no acute distress


HEENT:


Normocephalic andatraumatic. Tympanic membranes clear.Temporal 

arteriespulsatile and nontender.Fundoscopic exam unremarkable


Neck:


Supple without bruit, no meningismus


Musculoskeletal:


Stability:see neurologic. Gait exam:see neurologic. Tone:see 

neurologic.Strength:see neurologic.


Neurological:


Mental Status:orientation, memory, attention span/concentration, language, fund

of knowledge: does not know the name of the hospital or date, does not know the 

name of the president. Cranial Nerves:Pupils equal and reactive to light, 

extraocular movements areintact, visual fields are full to confrontation. 

Facial sensation is normal. There is no facial asymmetry. Vestibulo-ocular 

reflex is intact. Palate elvates and tongue protrudes in midline. All other 

cranial related problems are negative except as mentioned before.Reflexes:2+ 

and symmetric with flexor plantar responses. Motor:5/5 strength with normal 

tone and bulk. Coordination:Finger-nose finger and heel-to-shin testing are 

normal. Rapid alternating movements and fine finger movements are intact. Gait:

apraxic, does well with assistance. Sensory:Normal pinprick, vibration, light 

touch, proprioception.





Vitals


VITALS





Vital Signs








  Date Time  Temp Pulse Resp B/P (MAP) Pulse Ox O2 Delivery O2 Flow Rate FiO2


 


12/27/19 07:00 98.1 65 18 105/61 (76) 91 Room Air  





 98.1       











Labs


Labs





Laboratory Tests








Test


 12/26/19


16:00 12/26/19


16:10 12/26/19


19:15 12/26/19


20:08


 


Urine Collection Type U cath    


 


Urine Color Yellow    


 


Urine Clarity Clear    


 


Urine pH 5.0    


 


Urine Specific Gravity 1.025    


 


Urine Protein


 100 mg/dL


(NEG-TRACE) 


 


 





 


Urine Glucose (UA)


 Negative mg/dL


(NEG) 


 


 





 


Urine Ketones (Stick)


 Negative mg/dL


(NEG) 


 


 





 


Urine Blood Negative (NEG)    


 


Urine Nitrite Negative (NEG)    


 


Urine Bilirubin Small (NEG)    


 


Urine Urobilinogen Dipstick


 1.0 mg/dL (0.2


mg/dL) 


 


 





 


Urine Leukocyte Esterase Negative (NEG)    


 


Urine RBC 0 /HPF (0-2)    


 


Urine WBC 0 /HPF (0-4)    


 


Urine Squamous Epithelial


Cells None /LPF 


 


 


 





 


Urine Bacteria 0 /HPF (0-FEW)    


 


Urine Cellular Casts Occ /HPF    


 


Urine Hyaline Casts Many /HPF    


 


Urine Mucus Marked /LPF    


 


White Blood Count


 


 6.6 x10^3/uL


(4.0-11.0) 


 





 


Red Blood Count


 


 5.14 x10^6/uL


(3.50-5.40) 


 





 


Hemoglobin


 


 15.7 g/dL


(12.0-15.5) 


 





 


Hematocrit


 


 46.8 %


(36.0-47.0) 


 





 


Mean Corpuscular Volume  91 fL ()   


 


Mean Corpuscular Hemoglobin  31 pg (25-35)   


 


Mean Corpuscular Hemoglobin


Concent 


 34 g/dL


(31-37) 


 





 


Red Cell Distribution Width


 


 13.9 %


(11.5-14.5) 


 





 


Platelet Count


 


 232 x10^3/uL


(140-400) 


 





 


Neutrophils (%) (Auto)  44 % (31-73)   


 


Lymphocytes (%) (Auto)  44 % (24-48)   


 


Monocytes (%) (Auto)  8 % (0-9)   


 


Eosinophils (%) (Auto)  2 % (0-3)   


 


Basophils (%) (Auto)  1 % (0-3)   


 


Neutrophils # (Auto)


 


 2.9 x10^3/uL


(1.8-7.7) 


 





 


Lymphocytes # (Auto)


 


 2.9 x10^3/uL


(1.0-4.8) 


 





 


Monocytes # (Auto)


 


 0.6 x10^3/uL


(0.0-1.1) 


 





 


Eosinophils # (Auto)


 


 0.1 x10^3/uL


(0.0-0.7) 


 





 


Basophils # (Auto)


 


 0.0 x10^3/uL


(0.0-0.2) 


 





 


Sodium Level


 


 


 145 mmol/L


(136-145) 





 


Potassium Level


 


 


 4.1 mmol/L


(3.5-5.1) 





 


Chloride Level


 


 


 105 mmol/L


() 





 


Carbon Dioxide Level


 


 


 28 mmol/L


(21-32) 





 


Anion Gap   12 (6-14)  


 


Blood Urea Nitrogen


 


 


 11 mg/dL


(7-20) 





 


Creatinine


 


 


 0.8 mg/dL


(0.6-1.0) 





 


Estimated GFR


(Cockcroft-Gault) 


 


 82.9 


 





 


BUN/Creatinine Ratio   14 (6-20)  


 


Glucose Level


 


 


 135 mg/dL


(70-99) 





 


Lactic Acid Level


 


 


 4.0 mmol/L


(0.4-2.0) 





 


Calcium Level


 


 


 8.7 mg/dL


(8.5-10.1) 





 


Magnesium Level


 


 


 1.7 mg/dL


(1.8-2.4) 





 


Total Bilirubin


 


 


 0.2 mg/dL


(0.2-1.0) 





 


Aspartate Amino Transf


(AST/SGOT) 


 


 13 U/L (15-37) 


 





 


Alanine Aminotransferase


(ALT/SGPT) 


 


 12 U/L (14-59) 


 





 


Alkaline Phosphatase


 


 


 63 U/L


() 





 


Creatine Kinase


 


 


 39 U/L


() 





 


Troponin I Quantitative


 


 


 < 0.017 ng/mL


(0.000-0.055) 





 


NT-Pro-B-Type Natriuretic


Peptide 


 


 161 pg/mL


(0-449) 





 


Total Protein


 


 


 6.7 g/dL


(6.4-8.2) 





 


Albumin


 


 


 3.4 g/dL


(3.4-5.0) 





 


Albumin/Globulin Ratio   1.0 (1.0-1.7)  


 


Vitamin B12 Level


 


 


 314 pg/mL


(247-911) 





 


Thyroid Stimulating Hormone


(TSH) 


 


 1.786 uIU/mL


(0.358-3.74) 





 


Glucose (Fingerstick)


 


 


 


 121 mg/dL


(70-99)


 


Test


 12/26/19


21:30 12/27/19


07:54 


 





 


Lactic Acid Level


 2.5 mmol/L


(0.4-2.0) 


 


 





 


Glucose (Fingerstick)


 


 117 mg/dL


(70-99) 


 











Laboratory Tests








Test


 12/26/19


16:00 12/26/19


16:10 12/26/19


19:15 12/26/19


20:08


 


Urine Collection Type U cath    


 


Urine Color Yellow    


 


Urine Clarity Clear    


 


Urine pH 5.0    


 


Urine Specific Gravity 1.025    


 


Urine Protein


 100 mg/dL


(NEG-TRACE) 


 


 





 


Urine Glucose (UA)


 Negative mg/dL


(NEG) 


 


 





 


Urine Ketones (Stick)


 Negative mg/dL


(NEG) 


 


 





 


Urine Blood Negative (NEG)    


 


Urine Nitrite Negative (NEG)    


 


Urine Bilirubin Small (NEG)    


 


Urine Urobilinogen Dipstick


 1.0 mg/dL (0.2


mg/dL) 


 


 





 


Urine Leukocyte Esterase Negative (NEG)    


 


Urine RBC 0 /HPF (0-2)    


 


Urine WBC 0 /HPF (0-4)    


 


Urine Squamous Epithelial


Cells None /LPF 


 


 


 





 


Urine Bacteria 0 /HPF (0-FEW)    


 


Urine Cellular Casts Occ /HPF    


 


Urine Hyaline Casts Many /HPF    


 


Urine Mucus Marked /LPF    


 


White Blood Count


 


 6.6 x10^3/uL


(4.0-11.0) 


 





 


Red Blood Count


 


 5.14 x10^6/uL


(3.50-5.40) 


 





 


Hemoglobin


 


 15.7 g/dL


(12.0-15.5) 


 





 


Hematocrit


 


 46.8 %


(36.0-47.0) 


 





 


Mean Corpuscular Volume  91 fL ()   


 


Mean Corpuscular Hemoglobin  31 pg (25-35)   


 


Mean Corpuscular Hemoglobin


Concent 


 34 g/dL


(31-37) 


 





 


Red Cell Distribution Width


 


 13.9 %


(11.5-14.5) 


 





 


Platelet Count


 


 232 x10^3/uL


(140-400) 


 





 


Neutrophils (%) (Auto)  44 % (31-73)   


 


Lymphocytes (%) (Auto)  44 % (24-48)   


 


Monocytes (%) (Auto)  8 % (0-9)   


 


Eosinophils (%) (Auto)  2 % (0-3)   


 


Basophils (%) (Auto)  1 % (0-3)   


 


Neutrophils # (Auto)


 


 2.9 x10^3/uL


(1.8-7.7) 


 





 


Lymphocytes # (Auto)


 


 2.9 x10^3/uL


(1.0-4.8) 


 





 


Monocytes # (Auto)


 


 0.6 x10^3/uL


(0.0-1.1) 


 





 


Eosinophils # (Auto)


 


 0.1 x10^3/uL


(0.0-0.7) 


 





 


Basophils # (Auto)


 


 0.0 x10^3/uL


(0.0-0.2) 


 





 


Sodium Level


 


 


 145 mmol/L


(136-145) 





 


Potassium Level


 


 


 4.1 mmol/L


(3.5-5.1) 





 


Chloride Level


 


 


 105 mmol/L


() 





 


Carbon Dioxide Level


 


 


 28 mmol/L


(21-32) 





 


Anion Gap   12 (6-14)  


 


Blood Urea Nitrogen


 


 


 11 mg/dL


(7-20) 





 


Creatinine


 


 


 0.8 mg/dL


(0.6-1.0) 





 


Estimated GFR


(Cockcroft-Gault) 


 


 82.9 


 





 


BUN/Creatinine Ratio   14 (6-20)  


 


Glucose Level


 


 


 135 mg/dL


(70-99) 





 


Lactic Acid Level


 


 


 4.0 mmol/L


(0.4-2.0) 





 


Calcium Level


 


 


 8.7 mg/dL


(8.5-10.1) 





 


Magnesium Level


 


 


 1.7 mg/dL


(1.8-2.4) 





 


Total Bilirubin


 


 


 0.2 mg/dL


(0.2-1.0) 





 


Aspartate Amino Transf


(AST/SGOT) 


 


 13 U/L (15-37) 


 





 


Alanine Aminotransferase


(ALT/SGPT) 


 


 12 U/L (14-59) 


 





 


Alkaline Phosphatase


 


 


 63 U/L


() 





 


Creatine Kinase


 


 


 39 U/L


() 





 


Troponin I Quantitative


 


 


 < 0.017 ng/mL


(0.000-0.055) 





 


NT-Pro-B-Type Natriuretic


Peptide 


 


 161 pg/mL


(0-449) 





 


Total Protein


 


 


 6.7 g/dL


(6.4-8.2) 





 


Albumin


 


 


 3.4 g/dL


(3.4-5.0) 





 


Albumin/Globulin Ratio   1.0 (1.0-1.7)  


 


Vitamin B12 Level


 


 


 314 pg/mL


(247-911) 





 


Thyroid Stimulating Hormone


(TSH) 


 


 1.786 uIU/mL


(0.358-3.74) 





 


Glucose (Fingerstick)


 


 


 


 121 mg/dL


(70-99)


 


Test


 12/26/19


21:30 12/27/19


07:54 


 





 


Lactic Acid Level


 2.5 mmol/L


(0.4-2.0) 


 


 





 


Glucose (Fingerstick)


 


 117 mg/dL


(70-99) 


 














Images


Images


CT HEAD WO CONTRAST


 


History: Seizure


 


Comparison/Correlation: 12/4/2019 CT head and cervical spine


 


Findings: Axial images of the head were obtained without contrast. Atrophy


is present. Chronic ischemic changes of the white matter noted. No 


intracranial hemorrhage, midline shift, or mass effect. Bony structures 


are unremarkable. Partial opacification of posterior left ethmoid air 


cells noted. Right uzma bullosa is present.


 


Impression:


No suspicious process.





Assessment/Plan


Assessment/Plan


Impression:


Breakthrough partial-complex seizures, no obvious metabolic disturbance, doing 

fine now


Alzheimer's disease


Previous concern about diversion of her lorazepam, Urine drug screen okay in 

November 2018


Low magnesium level, corrected now


Lactic acidosis related to seizure





Recommendations:


Continue levetiracetam


Continue lorazepam


Consider discharge later today if she remains stable.


Follow-up with me in 2 months.





Thank you for letting me help with the patient's care.











LETHA MADISON MD           Dec 27, 2019 09:44

## 2019-12-27 NOTE — NUR
pt is discharged home with self care at 1523 via wheelchair via this RN. pt is in stable 
condition. pt has all belongings with her. pt and  received discharge instructions 
and prescriptions and stated they had no further questions for me.

## 2019-12-28 LAB — HBA1C MFR BLD: 6.5 % (ref 4.8–5.6)

## 2020-01-02 ENCOUNTER — HOSPITAL ENCOUNTER (EMERGENCY)
Dept: HOSPITAL 61 - ER | Age: 85
Discharge: HOME | End: 2020-01-02
Payer: COMMERCIAL

## 2020-01-02 VITALS — HEIGHT: 69 IN | WEIGHT: 134 LBS | BODY MASS INDEX: 19.85 KG/M2

## 2020-01-02 VITALS — DIASTOLIC BLOOD PRESSURE: 82 MMHG | SYSTOLIC BLOOD PRESSURE: 131 MMHG

## 2020-01-02 DIAGNOSIS — E11.9: ICD-10-CM

## 2020-01-02 DIAGNOSIS — Z88.8: ICD-10-CM

## 2020-01-02 DIAGNOSIS — J20.9: Primary | ICD-10-CM

## 2020-01-02 DIAGNOSIS — Z88.5: ICD-10-CM

## 2020-01-02 DIAGNOSIS — I10: ICD-10-CM

## 2020-01-02 DIAGNOSIS — K21.9: ICD-10-CM

## 2020-01-02 LAB
ALBUMIN SERPL-MCNC: 4 G/DL (ref 3.4–5)
ALBUMIN/GLOB SERPL: 0.9 {RATIO} (ref 1–1.7)
ALP SERPL-CCNC: 77 U/L (ref 46–116)
ALT SERPL-CCNC: 12 U/L (ref 14–59)
ANION GAP SERPL CALC-SCNC: 10 MMOL/L (ref 6–14)
APTT PPP: YELLOW S
AST SERPL-CCNC: 11 U/L (ref 15–37)
BACTERIA #/AREA URNS HPF: (no result) /HPF
BASOPHILS # BLD AUTO: 0 X10^3/UL (ref 0–0.2)
BASOPHILS NFR BLD: 0 % (ref 0–3)
BILIRUB SERPL-MCNC: 0.6 MG/DL (ref 0.2–1)
BILIRUB UR QL STRIP: NEGATIVE
BUN SERPL-MCNC: 7 MG/DL (ref 7–20)
BUN/CREAT SERPL: 10 (ref 6–20)
CALCIUM SERPL-MCNC: 9 MG/DL (ref 8.5–10.1)
CHLORIDE SERPL-SCNC: 100 MMOL/L (ref 98–107)
CO2 SERPL-SCNC: 31 MMOL/L (ref 21–32)
CREAT SERPL-MCNC: 0.7 MG/DL (ref 0.6–1)
EOSINOPHIL NFR BLD: 0.1 X10^3/UL (ref 0–0.7)
EOSINOPHIL NFR BLD: 2 % (ref 0–3)
ERYTHROCYTE [DISTWIDTH] IN BLOOD BY AUTOMATED COUNT: 13.5 % (ref 11.5–14.5)
FIBRINOGEN PPP-MCNC: (no result) MG/DL
GFR SERPLBLD BASED ON 1.73 SQ M-ARVRAT: 96.7 ML/MIN
GLOBULIN SER-MCNC: 4.3 G/DL (ref 2.2–3.8)
GLUCOSE SERPL-MCNC: 152 MG/DL (ref 70–99)
HCT VFR BLD CALC: 46.6 % (ref 36–47)
HGB BLD-MCNC: 15.7 G/DL (ref 12–15.5)
INFLUENZA A PATIENT: NEGATIVE
INFLUENZA B PATIENT: NEGATIVE
LYMPHOCYTES # BLD: 1.4 X10^3/UL (ref 1–4.8)
LYMPHOCYTES NFR BLD AUTO: 19 % (ref 24–48)
MCH RBC QN AUTO: 30 PG (ref 25–35)
MCHC RBC AUTO-ENTMCNC: 34 G/DL (ref 31–37)
MCV RBC AUTO: 89 FL (ref 79–100)
MONO #: 0.8 X10^3/UL (ref 0–1.1)
MONOCYTES NFR BLD: 11 % (ref 0–9)
NEUT #: 5.2 X10^3/UL (ref 1.8–7.7)
NEUTROPHILS NFR BLD AUTO: 68 % (ref 31–73)
NITRITE UR QL STRIP: NEGATIVE
PH UR STRIP: 6 [PH]
PLATELET # BLD AUTO: 209 X10^3/UL (ref 140–400)
POTASSIUM SERPL-SCNC: 3.8 MMOL/L (ref 3.5–5.1)
PROT SERPL-MCNC: 8.3 G/DL (ref 6.4–8.2)
PROT UR STRIP-MCNC: NEGATIVE MG/DL
RBC # BLD AUTO: 5.21 X10^6/UL (ref 3.5–5.4)
RBC #/AREA URNS HPF: (no result) /HPF (ref 0–2)
SODIUM SERPL-SCNC: 141 MMOL/L (ref 136–145)
SQUAMOUS #/AREA URNS LPF: (no result) /LPF
UROBILINOGEN UR-MCNC: 1 MG/DL
WBC # BLD AUTO: 7.7 X10^3/UL (ref 4–11)
WBC #/AREA URNS HPF: (no result) /HPF (ref 0–4)

## 2020-01-02 PROCEDURE — 83880 ASSAY OF NATRIURETIC PEPTIDE: CPT

## 2020-01-02 PROCEDURE — 85025 COMPLETE CBC W/AUTO DIFF WBC: CPT

## 2020-01-02 PROCEDURE — 84484 ASSAY OF TROPONIN QUANT: CPT

## 2020-01-02 PROCEDURE — 81001 URINALYSIS AUTO W/SCOPE: CPT

## 2020-01-02 PROCEDURE — 87804 INFLUENZA ASSAY W/OPTIC: CPT

## 2020-01-02 PROCEDURE — 87086 URINE CULTURE/COLONY COUNT: CPT

## 2020-01-02 PROCEDURE — 94640 AIRWAY INHALATION TREATMENT: CPT

## 2020-01-02 PROCEDURE — 36415 COLL VENOUS BLD VENIPUNCTURE: CPT

## 2020-01-02 PROCEDURE — 71045 X-RAY EXAM CHEST 1 VIEW: CPT

## 2020-01-02 PROCEDURE — 99285 EMERGENCY DEPT VISIT HI MDM: CPT

## 2020-01-02 PROCEDURE — 80053 COMPREHEN METABOLIC PANEL: CPT

## 2020-01-02 PROCEDURE — 93005 ELECTROCARDIOGRAM TRACING: CPT

## 2020-01-02 NOTE — PHYS DOC
Past Medical History


Past Medical History:  Anxiety, Dementia, Diabetes-Type II, GERD, Hypertension, 

Seizure


Past Surgical History:  Other


Additional Past Surgical Histo:  CATARACT, LEFT TOE


Alcohol Use:  None


Drug Use:  None





Adult General


Chief Complaint


Chief Complaint:  SHORTNESS OF BREATH





HPI


HPI





Patient is an 84-year-old female who presents with complaint of cough, fever and

shortness of breath that has been worsening over the last few days. Patient is 

not sure how high her fevers been. She denies any chest pain or abdominal pain. 

She states the shortness of breath is worsened with exertion. She also complains

of generalized weakness. She denies any vomiting or diarrhea.[]





Review of Systems


Review of Systems





Constitutional: Denies fever or chills []


Respiratory: Positive cough and shortness of breath []


Cardiovascular: No additional information not addressed in HPI []


GI: Denies abdominal pain, nausea, vomiting or diarrhea []


Integument: Denies rash or skin lesions []


Neurologic: Denies headache, focal weakness or sensory changes []





All other systems were reviewed and found to be within normal limits, except as 

documented in this note.





Current Medications


Current Medications





Current Medications








 Medications


  (Trade)  Dose


 Ordered  Sig/Zhou  Start Time


 Stop Time Status Last Admin


Dose Admin


 


 Albuterol/


 Ipratropium


  (Duoneb)  3 ml  1X  ONCE  1/2/20 07:45


 1/2/20 07:46 DC 1/2/20 08:05


3 ML


 


 Sodium Chloride  1,000 ml @ 


 1,000 mls/hr  Q1H  1/2/20 07:37


 1/2/20 08:36 DC 1/2/20 08:39


1,000 MLS/HR











Allergies


Allergies





Allergies








Coded Allergies Type Severity Reaction Last Updated Verified


 


  codeine Allergy Intermediate  12/3/18 Yes


 


  iodine Allergy Intermediate  12/3/18 Yes











Physical Exam


Physical Exam





Constitutional: Well developed, well nourished, no acute distress, non-toxic 

appearance. []


HENT: Normocephalic, atraumatic, bilateral external ears normal, oropharynx 

moist, no oral exudates, nose normal. []


Eyes: PERRLA, EOMI, conjunctiva normal, no discharge. [] 


Neck: Normal range of motion, no tenderness, supple, no stridor. [] 


Cardiovascular: Mildly tachycardic rate with regular rhythm[]


Lungs & Thorax: Fine rhonchi are noted bilaterally to auscultation []


Abdomen: Bowel sounds normal, soft, no tenderness. [] 


Skin: Warm, dry, no erythema, no rash. [] 


Extremities: No tenderness, no cyanosis, no clubbing, ROM intact. [] 


Neurologic: Alert and oriented X 3, no focal deficits noted. []





Current Patient Data


Vital Signs





                                   Vital Signs








  Date Time  Temp Pulse Resp B/P (MAP) Pulse Ox O2 Delivery O2 Flow Rate FiO2


 


1/2/20 08:05     95 Room Air  


 


1/2/20 07:30 99.6 105 20 149/83 (105)    





 99.6       








Lab Values





                                Laboratory Tests








Test


 1/2/20


07:40 1/2/20


08:15 1/2/20


10:10


 


White Blood Count


 7.7 x10^3/uL


(4.0-11.0) 


 





 


Red Blood Count


 5.21 x10^6/uL


(3.50-5.40) 


 





 


Hemoglobin


 15.7 g/dL


(12.0-15.5)  H 


 





 


Hematocrit


 46.6 %


(36.0-47.0) 


 





 


Mean Corpuscular Volume


 89 fL ()


 


 





 


Mean Corpuscular Hemoglobin 30 pg (25-35)    


 


Mean Corpuscular Hemoglobin


Concent 34 g/dL


(31-37) 


 





 


Red Cell Distribution Width


 13.5 %


(11.5-14.5) 


 





 


Platelet Count


 209 x10^3/uL


(140-400) 


 





 


Neutrophils (%) (Auto) 68 % (31-73)    


 


Lymphocytes (%) (Auto) 19 % (24-48)  L  


 


Monocytes (%) (Auto) 11 % (0-9)  H  


 


Eosinophils (%) (Auto) 2 % (0-3)    


 


Basophils (%) (Auto) 0 % (0-3)    


 


Neutrophils # (Auto)


 5.2 x10^3/uL


(1.8-7.7) 


 





 


Lymphocytes # (Auto)


 1.4 x10^3/uL


(1.0-4.8) 


 





 


Monocytes # (Auto)


 0.8 x10^3/uL


(0.0-1.1) 


 





 


Eosinophils # (Auto)


 0.1 x10^3/uL


(0.0-0.7) 


 





 


Basophils # (Auto)


 0.0 x10^3/uL


(0.0-0.2) 


 





 


Sodium Level


 141 mmol/L


(136-145) 


 





 


Potassium Level


 3.8 mmol/L


(3.5-5.1) 


 





 


Chloride Level


 100 mmol/L


() 


 





 


Carbon Dioxide Level


 31 mmol/L


(21-32) 


 





 


Anion Gap 10 (6-14)    


 


Blood Urea Nitrogen


 7 mg/dL (7-20)


 


 





 


Creatinine


 0.7 mg/dL


(0.6-1.0) 


 





 


Estimated GFR


(Cockcroft-Gault) 96.7  


 


 





 


BUN/Creatinine Ratio 10 (6-20)    


 


Glucose Level


 152 mg/dL


(70-99)  H 


 





 


Calcium Level


 9.0 mg/dL


(8.5-10.1) 


 





 


Total Bilirubin


 0.6 mg/dL


(0.2-1.0) 


 





 


Aspartate Amino Transferase


(AST) 11 U/L (15-37)


L 


 





 


Alanine Aminotransferase (ALT)


 12 U/L (14-59)


L 


 





 


Alkaline Phosphatase


 77 U/L


() 


 





 


Troponin I Quantitative


 < 0.017 ng/mL


(0.000-0.055) 


 





 


NT-Pro-B-Type Natriuretic


Peptide 285 pg/mL


(0-449) 


 





 


Total Protein


 8.3 g/dL


(6.4-8.2)  H 


 





 


Albumin


 4.0 g/dL


(3.4-5.0) 


 





 


Albumin/Globulin Ratio


 0.9 (1.0-1.7)


L 


 





 


Influenza Type A Antigen


 


 Negative


(NEGATIVE) 





 


Influenza Type B Antigen


 


 Negative


(NEGATIVE) 





 


Urine Collection Type   Unknown  


 


Urine Color   Yellow  


 


Urine Clarity   Cloudy  


 


Urine pH   6.0  


 


Urine Specific Gravity   1.015  


 


Urine Protein


 


 


 Negative mg/dL


(NEG-TRACE)


 


Urine Glucose (UA)


 


 


 Negative mg/dL


(NEG)


 


Urine Ketones (Stick)


 


 


 Negative mg/dL


(NEG)


 


Urine Blood


 


 


 Negative (NEG)





 


Urine Nitrite


 


 


 Negative (NEG)





 


Urine Bilirubin


 


 


 Negative (NEG)





 


Urine Urobilinogen Dipstick


 


 


 1.0 mg/dL (0.2


mg/dL)


 


Urine Leukocyte Esterase


 


 


 Moderate (NEG)





 


Urine RBC


 


 


 Occ /HPF (0-2)





 


Urine WBC


 


 


 1-4 /HPF (0-4)





 


Urine Squamous Epithelial


Cells 


 


 Few /LPF  





 


Urine Bacteria


 


 


 Few /HPF


(0-FEW)





                                Laboratory Tests


1/2/20 07:40








                                Laboratory Tests


1/2/20 07:40














EKG


EKG


[]





Radiology/Procedures


Radiology/Procedures


[]


Impressions:


PROCEDURE: PORTABLE CHEST 1V





PORTABLE CHEST 1V 1/2/2020 12:00 AM


 


INDICATION: Cough and fever


 


COMPARISON: 12/26/2019


 


TECHNIQUE: Portable frontal view of the chest is provided.


 


FINDINGS:


 


The cardiomediastinal silhouette is similar in appearance. There is 


persistent elevation right hemidiaphragm. No airspace consolidation is 


visualized.


 


There are no significant pleural effusions. There is no pulmonary vascular


congestion. No pneumothorax.


 


IMPRESSION:


 


There is no acute cardiopulmonary process.


 


Electronically signed by: Malgorzata Moses MD (1/2/2020 8:32 AM) 


[id number]





Course & Med Decision Making


Course & Med Decision Making


Pertinent Labs and Imaging studies reviewed. (See chart for details)





[]





Dragon Disclaimer


Dragon Disclaimer


This electronic medical record was generated, in whole or in part, using a voice

 recognition dictation system.





Departure


Departure


Impression:  


   Primary Impression:  


   Acute bronchitis


Disposition:  01 HOME, SELF-CARE


Condition:  STABLE


Referrals:  


[facility] MD (PCP)


Patient Instructions:  Acute Bronchitis


Scripts


Azithromycin (ZITHROMAX) 250 Mg Tablet


1 PKG PO UD, #6 TAB


   Prov: JEREMIE HERNADEZ Jr. DO         1/2/20 


Methylprednisolone (MEDROL) 4 Mg Tab.ds.pk


1 PKG PO UD, #1 PKG


   Prov: JEREMIE HERNADEZ Jr. DO         1/2/20





Problem Qualifiers








   Primary Impression:  


   Acute bronchitis


   Bronchitis organism:  unspecified organism  Qualified Codes:  J20.9 - Acute 

   bronchitis, unspecified








JEREMIE HERNADEZ Jr. DO           Jan 2, 2020 08:32

## 2020-01-02 NOTE — EKG
[facility]

              [address] [place] [address]

Test Date:    2020               Test Time:    08:17:51

Pat Name:     ELISEO BOLTON             Department:   

Patient ID:   [id number]           Room:          

Gender:       F                        Technician:   

:          [id number]               Requested By: JEREMIE HERNADEZ

Order Number: [id number]           Reading MD:     

                                 Measurements

Intervals                              Axis          

Rate:         106                      P:            9

WY:           154                      QRS:          -92

QRSD:         132                      T:            38

QT:           366                                    

QTc:          488                                    

                           Interpretive Statements

SINUS TACHYCARDIA

ABNORMAL RIGHT SUPERIOR AXIS DEVIATION

LEFT ANTERIOR FASCICULAR BLOCK

NON SPECIFIC INTRAVENTRICULAR BLOCK

RVH WITH REPOLARIZATION ABNORMALITY

ABNORMAL ECG

No previous ECG available for comparison

## 2020-01-02 NOTE — RAD
PORTABLE CHEST 1V 1/2/2020 12:00 AM

 

INDICATION: Cough and fever

 

COMPARISON: 12/26/2019

 

TECHNIQUE: Portable frontal view of the chest is provided.

 

FINDINGS:

 

The cardiomediastinal silhouette is similar in appearance. There is 

persistent elevation right hemidiaphragm. No airspace consolidation is 

visualized.

 

There are no significant pleural effusions. There is no pulmonary vascular

congestion. No pneumothorax.

 

IMPRESSION:

 

There is no acute cardiopulmonary process.

 

Electronically signed by: Malgorzata Moses MD (1/2/2020 8:32 AM) 

[id number]

## 2020-01-10 VITALS
DIASTOLIC BLOOD PRESSURE: 69 MMHG | DIASTOLIC BLOOD PRESSURE: 69 MMHG | SYSTOLIC BLOOD PRESSURE: 153 MMHG | DIASTOLIC BLOOD PRESSURE: 69 MMHG | SYSTOLIC BLOOD PRESSURE: 153 MMHG | SYSTOLIC BLOOD PRESSURE: 153 MMHG

## 2020-06-04 ENCOUNTER — HOSPITAL ENCOUNTER (OUTPATIENT)
Dept: HOSPITAL 61 - CT | Age: 85
Discharge: HOME | End: 2020-06-04
Attending: INTERNAL MEDICINE
Payer: COMMERCIAL

## 2020-06-04 DIAGNOSIS — R91.8: ICD-10-CM

## 2020-06-04 DIAGNOSIS — R91.1: Primary | ICD-10-CM

## 2020-06-04 PROCEDURE — 71250 CT THORAX DX C-: CPT

## 2020-06-04 NOTE — RAD
EXAM: CT Chest without IV contrast

 

INDICATION: Reason: abnormal CT   lung nodule / Spl. Instructions:  / 

History: 

 

TECHNIQUE:  Multi-detector row CT images were acquired from the thoracic 

inlet through the upper abdomen without the use of IV contrast. Sagittal 

and coronal images were acquired from the transaxial data. All CT scans 

performed at this facility utilize dose optimization techniques as 

appropriate to the exam, including the following: Automated exposure 

control and adjustment of the mA and/or KV according to patient size (this

includes techniques or standardized protocols for targeted exams where 

dose is indication/reason for exam).

 

COMPARISON: Abdomen and chest CT of 1/9/2020, CT guided liver biopsy of 

1/18/2006.

 

FINDINGS: 

 

The absence of IV contrast limits evaluation of soft tissue pathology.

 

CARDIOVASCULAR:  Unremarkable

 

MEDIASTINUM & SEAN: No adenopathy or masses.

 

LUNGS: Previously reported has since resolved. 2.5 cm groundglass 

attenuation nodule in the left lower lobe. No residual masslike opacity or

dominant pulmonary nodule is appreciated. There is mild motion artifact 

that degrades detail.

 

PLEURAL SPACE: No pleural effusions or pneumothorax.

 

OSSEOUS & SOFT TISSUE: Unremarkable

 

ABDOMEN:  Included upper abdomen shows a persistent area of 

hypoattenuation in the periphery of the right lung adjacent to the 

gallbladder, previously targeted for biopsy in 2006.

 

IMPRESSION:

Interval resolution of the 2.5 cm groundglass opacity in the left lower 

lobe, compatible with an inflammatory lesion. No persistent intrathoracic 

findings of concern for malignancy currently.

 

Electronically signed by: Nakia Villarreal MD (6/4/2020 8:45 AM) 

[id number]

## 2020-06-15 ENCOUNTER — HOSPITAL ENCOUNTER (OUTPATIENT)
Dept: HOSPITAL 61 - CT | Age: 85
Discharge: HOME | End: 2020-06-15
Attending: FAMILY MEDICINE
Payer: COMMERCIAL

## 2020-06-15 DIAGNOSIS — R63.4: ICD-10-CM

## 2020-06-15 DIAGNOSIS — R91.1: ICD-10-CM

## 2020-06-15 DIAGNOSIS — K80.20: Primary | ICD-10-CM

## 2020-06-15 PROCEDURE — 74150 CT ABDOMEN W/O CONTRAST: CPT

## 2020-06-15 NOTE — RAD
PQRS Compliance Statement:

 

One or more of the following individualized dose reduction techniques were

utilized for this examination:  

1. Automated exposure control  

2. Adjustment of the mA and/or kV according to patient size  

3. Use of iterative reconstruction technique

 

CT ABDOMEN WO CONTRAST 6/15/2020 8:28 AM

 

Indication: Weight loss

 

COMPARISON: CT chest, abdomen and pelvis 1/19/2020.

 

TECHNIQUE: Multiple axial CT images of the abdomen were obtained without 

intravenous contrast.

 

FINDINGS:

 

Lung bases are clear. Heart size is within normal limits. Evaluation of 

the solid abdominal viscera is limited by lack of intravenous contrast.

 

Stable 2.2 x 2.2 cm hypoattenuating lesion along the gallbladder fossa in 

segment VI. Special spleen, bilateral adrenal glands, pancreas and bowel 

are normal in appearance. No bowel obstruction or inflammation.

 

Cholelithiasis. Indeterminate hypoattenuating lesion in the superior pole 

left kidney measures 1.9 cm x 1.9 cm. No hydronephrosis. No renal 

catheter. No suspicious osseous abnormality is identified. Superior plate 

Schmorl's node is identified at L1 with 25 percent height loss, stable. 

Stable mild height loss involving L4 with less than 15 percent height 

loss.

 

IMPRESSION:

1. No suspicious residual abnormality in the left lower lobe.

2. Cholelithiasis.

3. Stable indeterminate hypoattenuating lesion in segment VI of the liver 

measuring 2.2 x 2.2 cm, stable when measured in similar dimensions. MRI of

the abdomen with without contrast may be of benefit for further 

evaluation.

4. Indeterminate, stable superior pole left renal lesion measures 1.9 x 

1.9 cm. Attention on MRI abdomen may be of benefit.

 

Electronically signed by: Malgorzata Moses MD (6/15/2020 8:59 AM) 

[facility]

## 2020-07-01 ENCOUNTER — HOSPITAL ENCOUNTER (OUTPATIENT)
Dept: HOSPITAL 61 - KCIC | Age: 85
Discharge: HOME | End: 2020-07-01
Attending: FAMILY MEDICINE
Payer: COMMERCIAL

## 2020-07-01 DIAGNOSIS — M17.11: Primary | ICD-10-CM

## 2020-07-01 DIAGNOSIS — M25.461: ICD-10-CM

## 2020-07-01 DIAGNOSIS — M25.761: ICD-10-CM

## 2020-07-01 DIAGNOSIS — M76.51: ICD-10-CM

## 2020-07-01 PROCEDURE — 73562 X-RAY EXAM OF KNEE 3: CPT

## 2020-07-01 NOTE — [facility] right knee radiographs 7/1/2020

 

CLINICAL HISTORY: Chronic right knee pain.

 

AP, oblique and lateral digital radiographs of the right knee were 

obtained. Moderate degenerative changes are seen involving all 3 

compartments of the right knee. These consist of joint compartment 

narrowing, subchondral sclerosis and associated osteophyte formation. 

Moderate enthesophyte formation is seen involving the anterosuperior 

aspect of the right patella. There is a very small right suprapatella 

joint effusion. No fracture or dislocation of the right knee is seen.

 

IMPRESSION: Moderate degenerative changes are seen within the right knee. 

No acute fracture or dislocation is seen.

 

Electronically signed by: Jarrell Hogan MD (7/1/2020 1:53 PM) [id number]